# Patient Record
Sex: MALE | Race: WHITE | NOT HISPANIC OR LATINO | Employment: OTHER | URBAN - METROPOLITAN AREA
[De-identification: names, ages, dates, MRNs, and addresses within clinical notes are randomized per-mention and may not be internally consistent; named-entity substitution may affect disease eponyms.]

---

## 2023-03-21 ENCOUNTER — TELEPHONE (OUTPATIENT)
Dept: PRIMARY CARE | Facility: CLINIC | Age: 73
End: 2023-03-21

## 2023-05-03 LAB
CREATININE (MG/DL) IN SER/PLAS: 1.3 MG/DL (ref 0.5–1.3)
GFR MALE: 57 ML/MIN/1.73M2

## 2023-06-20 LAB
ALANINE AMINOTRANSFERASE (SGPT) (U/L) IN SER/PLAS: 19 U/L (ref 10–52)
ALBUMIN (G/DL) IN SER/PLAS: 3.8 G/DL (ref 3.4–5)
ALKALINE PHOSPHATASE (U/L) IN SER/PLAS: 89 U/L (ref 33–136)
ANION GAP IN SER/PLAS: 12 MMOL/L (ref 10–20)
ASPARTATE AMINOTRANSFERASE (SGOT) (U/L) IN SER/PLAS: 14 U/L (ref 9–39)
BASOPHILS (10*3/UL) IN BLOOD BY AUTOMATED COUNT: 0.05 X10E9/L (ref 0–0.1)
BASOPHILS/100 LEUKOCYTES IN BLOOD BY AUTOMATED COUNT: 0.6 % (ref 0–2)
BILIRUBIN TOTAL (MG/DL) IN SER/PLAS: 0.5 MG/DL (ref 0–1.2)
CALCIUM (MG/DL) IN SER/PLAS: 8.9 MG/DL (ref 8.6–10.3)
CARBON DIOXIDE, TOTAL (MMOL/L) IN SER/PLAS: 22 MMOL/L (ref 21–32)
CHLORIDE (MMOL/L) IN SER/PLAS: 109 MMOL/L (ref 98–107)
CREATININE (MG/DL) IN SER/PLAS: 1.21 MG/DL (ref 0.5–1.3)
EOSINOPHILS (10*3/UL) IN BLOOD BY AUTOMATED COUNT: 0.52 X10E9/L (ref 0–0.4)
EOSINOPHILS/100 LEUKOCYTES IN BLOOD BY AUTOMATED COUNT: 6.7 % (ref 0–6)
ERYTHROCYTE DISTRIBUTION WIDTH (RATIO) BY AUTOMATED COUNT: 16.8 % (ref 11.5–14.5)
ERYTHROCYTE MEAN CORPUSCULAR HEMOGLOBIN CONCENTRATION (G/DL) BY AUTOMATED: 32.5 G/DL (ref 32–36)
ERYTHROCYTE MEAN CORPUSCULAR VOLUME (FL) BY AUTOMATED COUNT: 84 FL (ref 80–100)
ERYTHROCYTES (10*6/UL) IN BLOOD BY AUTOMATED COUNT: 6.32 X10E12/L (ref 4.5–5.9)
FERRITIN (UG/LL) IN SER/PLAS: 282 UG/L (ref 20–300)
GENETICS TEST NAME-DATA CONVERSION: NORMAL
GFR MALE: 62 ML/MIN/1.73M2
GLUCOSE (MG/DL) IN SER/PLAS: 92 MG/DL (ref 74–99)
HEMATOCRIT (%) IN BLOOD BY AUTOMATED COUNT: 52.9 % (ref 41–52)
HEMOGLOBIN (G/DL) IN BLOOD: 17.2 G/DL (ref 13.5–17.5)
IMMATURE GRANULOCYTES/100 LEUKOCYTES IN BLOOD BY AUTOMATED COUNT: 0.4 % (ref 0–0.9)
IRON (UG/DL) IN SER/PLAS: 70 UG/DL (ref 35–150)
IRON BINDING CAPACITY (UG/DL) IN SER/PLAS: 288 UG/DL (ref 240–445)
IRON SATURATION (%) IN SER/PLAS: 24 % (ref 25–45)
LAB MOLECULAR CA TECHNICAL NOTES: NORMAL
LEUKOCYTES (10*3/UL) IN BLOOD BY AUTOMATED COUNT: 7.8 X10E9/L (ref 4.4–11.3)
LYMPHOCYTES (10*3/UL) IN BLOOD BY AUTOMATED COUNT: 1.93 X10E9/L (ref 0.8–3)
LYMPHOCYTES/100 LEUKOCYTES IN BLOOD BY AUTOMATED COUNT: 24.7 % (ref 13–44)
MONOCYTES (10*3/UL) IN BLOOD BY AUTOMATED COUNT: 0.7 X10E9/L (ref 0.05–0.8)
MONOCYTES/100 LEUKOCYTES IN BLOOD BY AUTOMATED COUNT: 9 % (ref 2–10)
NEUTROPHILS (10*3/UL) IN BLOOD BY AUTOMATED COUNT: 4.58 X10E9/L (ref 1.6–5.5)
NEUTROPHILS/100 LEUKOCYTES IN BLOOD BY AUTOMATED COUNT: 58.6 % (ref 40–80)
PLATELETS (10*3/UL) IN BLOOD AUTOMATED COUNT: 186 X10E9/L (ref 150–450)
POTASSIUM (MMOL/L) IN SER/PLAS: 4.3 MMOL/L (ref 3.5–5.3)
PROTEIN TOTAL: 6.6 G/DL (ref 6.4–8.2)
SODIUM (MMOL/L) IN SER/PLAS: 139 MMOL/L (ref 136–145)
UREA NITROGEN (MG/DL) IN SER/PLAS: 26 MG/DL (ref 6–23)

## 2023-07-28 RX ORDER — PROPYLENE GLYCOL 0.6 G/100ML
LIQUID OPHTHALMIC DAILY
COMMUNITY

## 2023-07-28 RX ORDER — FLUTICASONE PROPIONATE 50 MCG
2 SPRAY, SUSPENSION (ML) NASAL DAILY
COMMUNITY
Start: 2019-08-08

## 2023-07-28 RX ORDER — CHOLESTYRAMINE 4 G/9G
1 POWDER, FOR SUSPENSION ORAL 2 TIMES DAILY
COMMUNITY
Start: 2019-01-10 | End: 2023-07-31 | Stop reason: SDUPTHER

## 2023-07-28 RX ORDER — TAMSULOSIN HYDROCHLORIDE 0.4 MG/1
CAPSULE ORAL
COMMUNITY
Start: 2017-10-20 | End: 2024-03-19 | Stop reason: SDUPTHER

## 2023-07-28 RX ORDER — LISINOPRIL 20 MG/1
1 TABLET ORAL DAILY
COMMUNITY
Start: 2019-08-06 | End: 2023-07-31 | Stop reason: SDUPTHER

## 2023-07-28 RX ORDER — CHOLECALCIFEROL (VITAMIN D3) 125 MCG
CAPSULE ORAL DAILY
COMMUNITY

## 2023-07-28 RX ORDER — MESALAMINE 400 MG/1
800 CAPSULE, DELAYED RELEASE ORAL 2 TIMES DAILY
COMMUNITY
Start: 2019-06-27 | End: 2023-07-31 | Stop reason: SDUPTHER

## 2023-07-28 RX ORDER — CETIRIZINE HYDROCHLORIDE 10 MG/1
TABLET ORAL DAILY
COMMUNITY

## 2023-07-31 ENCOUNTER — OFFICE VISIT (OUTPATIENT)
Dept: PRIMARY CARE | Facility: CLINIC | Age: 73
End: 2023-07-31
Payer: MEDICARE

## 2023-07-31 VITALS
SYSTOLIC BLOOD PRESSURE: 164 MMHG | WEIGHT: 203 LBS | BODY MASS INDEX: 32.62 KG/M2 | TEMPERATURE: 97.2 F | OXYGEN SATURATION: 96 % | DIASTOLIC BLOOD PRESSURE: 88 MMHG | HEIGHT: 66 IN | HEART RATE: 79 BPM

## 2023-07-31 DIAGNOSIS — K51.90 ULCERATIVE COLITIS WITHOUT COMPLICATIONS, UNSPECIFIED LOCATION (MULTI): ICD-10-CM

## 2023-07-31 DIAGNOSIS — J30.1 ALLERGIC RHINITIS DUE TO POLLEN, UNSPECIFIED SEASONALITY: ICD-10-CM

## 2023-07-31 DIAGNOSIS — K21.9 CHRONIC GERD: ICD-10-CM

## 2023-07-31 DIAGNOSIS — M16.11 PRIMARY OSTEOARTHRITIS OF RIGHT HIP: ICD-10-CM

## 2023-07-31 DIAGNOSIS — D75.1 POLYCYTHEMIA: ICD-10-CM

## 2023-07-31 DIAGNOSIS — I10 ESSENTIAL HYPERTENSION: Primary | ICD-10-CM

## 2023-07-31 DIAGNOSIS — Z13.6 SCREENING FOR CARDIOVASCULAR CONDITION: ICD-10-CM

## 2023-07-31 DIAGNOSIS — N40.1 BENIGN PROSTATIC HYPERPLASIA WITH LOWER URINARY TRACT SYMPTOMS, SYMPTOM DETAILS UNSPECIFIED: ICD-10-CM

## 2023-07-31 PROBLEM — R73.01 IFG (IMPAIRED FASTING GLUCOSE): Status: ACTIVE | Noted: 2023-07-31

## 2023-07-31 PROBLEM — N40.0 BENIGN ENLARGEMENT OF PROSTATE: Status: ACTIVE | Noted: 2023-07-31

## 2023-07-31 PROBLEM — J30.9 ALLERGIC RHINITIS: Status: ACTIVE | Noted: 2023-07-31

## 2023-07-31 PROBLEM — Z85.51 HISTORY OF BLADDER CANCER: Status: ACTIVE | Noted: 2023-07-31

## 2023-07-31 PROCEDURE — 99214 OFFICE O/P EST MOD 30 MIN: CPT | Performed by: INTERNAL MEDICINE

## 2023-07-31 PROCEDURE — 1036F TOBACCO NON-USER: CPT | Performed by: INTERNAL MEDICINE

## 2023-07-31 PROCEDURE — 3077F SYST BP >= 140 MM HG: CPT | Performed by: INTERNAL MEDICINE

## 2023-07-31 PROCEDURE — 1159F MED LIST DOCD IN RCRD: CPT | Performed by: INTERNAL MEDICINE

## 2023-07-31 PROCEDURE — 3079F DIAST BP 80-89 MM HG: CPT | Performed by: INTERNAL MEDICINE

## 2023-07-31 RX ORDER — LISINOPRIL 20 MG/1
20 TABLET ORAL DAILY
Qty: 90 TABLET | Refills: 1 | Status: SHIPPED | OUTPATIENT
Start: 2023-07-31 | End: 2024-01-31 | Stop reason: SDUPTHER

## 2023-07-31 RX ORDER — CHOLESTYRAMINE 4 G/9G
1 POWDER, FOR SUSPENSION ORAL 2 TIMES DAILY
Qty: 180 PACKET | Refills: 1 | Status: SHIPPED | OUTPATIENT
Start: 2023-07-31 | End: 2024-01-31 | Stop reason: SDUPTHER

## 2023-07-31 RX ORDER — MESALAMINE 400 MG/1
800 CAPSULE, DELAYED RELEASE ORAL 2 TIMES DAILY
Qty: 360 CAPSULE | Refills: 1 | Status: SHIPPED | OUTPATIENT
Start: 2023-07-31 | End: 2024-01-31 | Stop reason: SDUPTHER

## 2023-07-31 ASSESSMENT — ENCOUNTER SYMPTOMS
ENDOCRINE NEGATIVE: 1
GASTROINTESTINAL NEGATIVE: 1
MUSCULOSKELETAL NEGATIVE: 1
PSYCHIATRIC NEGATIVE: 1
ALLERGIC/IMMUNOLOGIC NEGATIVE: 1
HEMATOLOGIC/LYMPHATIC NEGATIVE: 1
RESPIRATORY NEGATIVE: 1
NEUROLOGICAL NEGATIVE: 1
CONSTITUTIONAL NEGATIVE: 1
EYES NEGATIVE: 1
CARDIOVASCULAR NEGATIVE: 1

## 2023-07-31 NOTE — PROGRESS NOTES
"Subjective   Patient ID: Jaime Mcnair is a 73 y.o. male who presents for 6 month follow up .  Patient presents today in follow up re:   HTN, GERD and allergic rhinitis.    Patient presents in follow up regarding hypertension.  Blood pressure has been well controlled on current therapy.  No adverse effects of medication reported.  Patient denies chest pain, palpitations, shortness of breath, orthopnea, fatigue or edema.    Patient reports reflux symptoms have been well controlled with current therapy.  Does have occasional flare of Sx after eating certain food.  No dysphagia, regurgitation or other associated complaints.    Rhinitis Sx well compensated on current therapy.          Review of Systems   Constitutional: Negative.    HENT: Negative.     Eyes: Negative.    Respiratory: Negative.     Cardiovascular: Negative.    Gastrointestinal: Negative.    Endocrine: Negative.    Genitourinary: Negative.    Musculoskeletal: Negative.    Skin: Negative.    Allergic/Immunologic: Negative.    Neurological: Negative.    Hematological: Negative.    Psychiatric/Behavioral: Negative.         Objective     Blood pressure 164/88, pulse 79, temperature 36.2 °C (97.2 °F), temperature source Temporal, height 1.67 m (5' 5.75\"), weight 92.1 kg (203 lb), SpO2 96 %.   Physical Exam  Vitals reviewed.   Constitutional:       Appearance: Normal appearance.   Neck:      Vascular: No carotid bruit.   Cardiovascular:      Rate and Rhythm: Normal rate and regular rhythm.      Pulses: Normal pulses.      Heart sounds: Normal heart sounds. No murmur heard.  Pulmonary:      Effort: Pulmonary effort is normal.      Breath sounds: Normal breath sounds. No wheezing or rales.   Abdominal:      General: Abdomen is flat. There is no distension.      Palpations: Abdomen is soft.      Tenderness: There is no abdominal tenderness.   Musculoskeletal:         General: Normal range of motion.      Cervical back: Normal range of motion and neck supple. "   Skin:     General: Skin is warm and dry.   Neurological:      General: No focal deficit present.      Mental Status: He is alert and oriented to person, place, and time.   Psychiatric:         Mood and Affect: Mood normal.         Behavior: Behavior normal.         Assessment/Plan   Problem List Items Addressed This Visit       Allergic rhinitis    Benign enlargement of prostate    Relevant Orders    Prostate Specific Antigen    Chronic GERD    Essential hypertension - Primary    Relevant Medications    lisinopril 20 mg tablet    Other Relevant Orders    Comprehensive Metabolic Panel    Polycythemia    Relevant Orders    CBC and Auto Differential    Primary osteoarthritis of right hip    Ulcerative colitis (CMS/HCC)    Relevant Medications    cholestyramine (Questran) 4 gram packet    mesalamine (Delzicol) 400 mg DR capsule     Other Visit Diagnoses       Screening for cardiovascular condition        Relevant Orders    Lipid Panel          BP well controlled on current therapy.  Will continue present therapy and follow.  GERD Sx well compensated on current therapy.  Will continue present therapy and follow clinically.  Rhinitis Sx well compensated on current therapy.  Will continue present therapy and follow.  Patient is scheduled to have right hip arthroplasty in the near future per Dr. Little.  He continues to follow with Heme re:  polycythemia.    Follow up in 6 months  Lab to be drawn 1 week prior to next office visit.

## 2023-08-16 ENCOUNTER — HOSPITAL ENCOUNTER (OUTPATIENT)
Dept: DATA CONVERSION | Facility: HOSPITAL | Age: 73
End: 2023-08-17
Attending: ORTHOPAEDIC SURGERY | Admitting: ORTHOPAEDIC SURGERY
Payer: MEDICARE

## 2023-08-16 DIAGNOSIS — M16.11 UNILATERAL PRIMARY OSTEOARTHRITIS, RIGHT HIP: ICD-10-CM

## 2023-08-16 DIAGNOSIS — N40.0 BENIGN PROSTATIC HYPERPLASIA WITHOUT LOWER URINARY TRACT SYMPTOMS: ICD-10-CM

## 2023-08-16 DIAGNOSIS — K21.9 GASTRO-ESOPHAGEAL REFLUX DISEASE WITHOUT ESOPHAGITIS: ICD-10-CM

## 2023-08-16 DIAGNOSIS — I10 ESSENTIAL (PRIMARY) HYPERTENSION: ICD-10-CM

## 2023-08-16 DIAGNOSIS — E78.5 HYPERLIPIDEMIA, UNSPECIFIED: ICD-10-CM

## 2023-08-16 DIAGNOSIS — Z87.891 PERSONAL HISTORY OF NICOTINE DEPENDENCE: ICD-10-CM

## 2023-08-16 DIAGNOSIS — E66.9 OBESITY, UNSPECIFIED: ICD-10-CM

## 2023-08-16 LAB
ABO GROUP (TYPE) IN BLOOD: NORMAL
ANTIBODY SCREEN: NORMAL
RH FACTOR: NORMAL

## 2023-08-17 LAB
ALANINE AMINOTRANSFERASE (SGPT) (U/L) IN SER/PLAS: 16 U/L (ref 10–52)
ALBUMIN (G/DL) IN SER/PLAS: 3.2 G/DL (ref 3.4–5)
ALKALINE PHOSPHATASE (U/L) IN SER/PLAS: 73 U/L (ref 33–136)
ANION GAP IN SER/PLAS: 11 MMOL/L (ref 10–20)
ASPARTATE AMINOTRANSFERASE (SGOT) (U/L) IN SER/PLAS: 26 U/L (ref 9–39)
BASOPHILS (10*3/UL) IN BLOOD BY AUTOMATED COUNT: 0.03 X10E9/L (ref 0–0.1)
BASOPHILS/100 LEUKOCYTES IN BLOOD BY AUTOMATED COUNT: 0.2 % (ref 0–2)
BILIRUBIN TOTAL (MG/DL) IN SER/PLAS: 0.4 MG/DL (ref 0–1.2)
CALCIUM (MG/DL) IN SER/PLAS: 8.1 MG/DL (ref 8.6–10.3)
CARBON DIOXIDE, TOTAL (MMOL/L) IN SER/PLAS: 23 MMOL/L (ref 21–32)
CHLORIDE (MMOL/L) IN SER/PLAS: 106 MMOL/L (ref 98–107)
CREATININE (MG/DL) IN SER/PLAS: 1.39 MG/DL (ref 0.5–1.3)
EOSINOPHILS (10*3/UL) IN BLOOD BY AUTOMATED COUNT: 0.01 X10E9/L (ref 0–0.4)
EOSINOPHILS/100 LEUKOCYTES IN BLOOD BY AUTOMATED COUNT: 0.1 % (ref 0–6)
ERYTHROCYTE DISTRIBUTION WIDTH (RATIO) BY AUTOMATED COUNT: 14.8 % (ref 11.5–14.5)
ERYTHROCYTE MEAN CORPUSCULAR HEMOGLOBIN CONCENTRATION (G/DL) BY AUTOMATED: 33.9 G/DL (ref 32–36)
ERYTHROCYTE MEAN CORPUSCULAR VOLUME (FL) BY AUTOMATED COUNT: 81 FL (ref 80–100)
ERYTHROCYTES (10*6/UL) IN BLOOD BY AUTOMATED COUNT: 5.46 X10E12/L (ref 4.5–5.9)
GFR MALE: 53 ML/MIN/1.73M2
GLUCOSE (MG/DL) IN SER/PLAS: 110 MG/DL (ref 74–99)
HEMATOCRIT (%) IN BLOOD BY AUTOMATED COUNT: 44.2 % (ref 41–52)
HEMOGLOBIN (G/DL) IN BLOOD: 15 G/DL (ref 13.5–17.5)
IMMATURE GRANULOCYTES/100 LEUKOCYTES IN BLOOD BY AUTOMATED COUNT: 0.4 % (ref 0–0.9)
LEUKOCYTES (10*3/UL) IN BLOOD BY AUTOMATED COUNT: 13.4 X10E9/L (ref 4.4–11.3)
LYMPHOCYTES (10*3/UL) IN BLOOD BY AUTOMATED COUNT: 1.19 X10E9/L (ref 0.8–3)
LYMPHOCYTES/100 LEUKOCYTES IN BLOOD BY AUTOMATED COUNT: 8.9 % (ref 13–44)
MONOCYTES (10*3/UL) IN BLOOD BY AUTOMATED COUNT: 1.47 X10E9/L (ref 0.05–0.8)
MONOCYTES/100 LEUKOCYTES IN BLOOD BY AUTOMATED COUNT: 11 % (ref 2–10)
NEUTROPHILS (10*3/UL) IN BLOOD BY AUTOMATED COUNT: 10.6 X10E9/L (ref 1.6–5.5)
NEUTROPHILS/100 LEUKOCYTES IN BLOOD BY AUTOMATED COUNT: 79.4 % (ref 40–80)
PLATELETS (10*3/UL) IN BLOOD AUTOMATED COUNT: 197 X10E9/L (ref 150–450)
POTASSIUM (MMOL/L) IN SER/PLAS: 4.5 MMOL/L (ref 3.5–5.3)
PROTEIN TOTAL: 5.5 G/DL (ref 6.4–8.2)
SODIUM (MMOL/L) IN SER/PLAS: 135 MMOL/L (ref 136–145)
UREA NITROGEN (MG/DL) IN SER/PLAS: 23 MG/DL (ref 6–23)

## 2023-09-29 VITALS — HEIGHT: 68 IN | WEIGHT: 202.82 LBS | BODY MASS INDEX: 30.74 KG/M2

## 2023-09-29 PROBLEM — R35.1 NOCTURIA: Status: ACTIVE | Noted: 2023-09-29

## 2023-09-29 PROBLEM — R10.31 ABDOMINAL PAIN, ACUTE, RIGHT LOWER QUADRANT: Status: ACTIVE | Noted: 2023-09-29

## 2023-09-29 RX ORDER — TRAMADOL HYDROCHLORIDE 50 MG/1
TABLET ORAL
COMMUNITY
Start: 2023-08-15 | End: 2024-01-31 | Stop reason: ALTCHOICE

## 2023-09-29 RX ORDER — CYCLOBENZAPRINE HCL 5 MG
TABLET ORAL
COMMUNITY
Start: 2023-08-15 | End: 2024-03-12 | Stop reason: WASHOUT

## 2023-09-29 RX ORDER — LISINOPRIL 10 MG/1
TABLET ORAL
COMMUNITY
Start: 2017-03-13 | End: 2024-01-31 | Stop reason: WASHOUT

## 2023-09-29 RX ORDER — OXYCODONE HYDROCHLORIDE 5 MG/1
TABLET ORAL
COMMUNITY
Start: 2023-08-15 | End: 2024-01-31 | Stop reason: ALTCHOICE

## 2023-09-29 RX ORDER — MELOXICAM 15 MG/1
TABLET ORAL
COMMUNITY
Start: 2017-05-03 | End: 2024-03-12 | Stop reason: WASHOUT

## 2023-09-29 RX ORDER — METHOCARBAMOL 750 MG/1
750 TABLET, FILM COATED ORAL 3 TIMES DAILY
COMMUNITY
Start: 2023-04-07 | End: 2024-01-31 | Stop reason: ALTCHOICE

## 2023-09-29 RX ORDER — PANTOPRAZOLE SODIUM 40 MG/1
TABLET, DELAYED RELEASE ORAL
COMMUNITY
Start: 2023-08-15

## 2023-09-30 NOTE — DISCHARGE SUMMARY
Send Summary:   Discharge Summary Providers:  Provider Role Provider Name   · Primary Josafat Barillas       Note Recipients: Josafat Barillas MD       Discharge:    Summary:   Admission Date: .16-Aug-2023 07:21:00   Discharge Date: 17-Aug-2023   Attending Physician at Discharge: Juan Little   Admission Reason: Right hip DJD   Final Discharge Diagnoses: Status post right hip  replacement   Procedures: Date: 16-Aug-2023 10:53:00  Procedure Name: 1. RIGHT TOTAL HIP ARTHROPLASTY   Condition at Discharge: Satisfactory   Disposition at Discharge: .Home   Hospital Course:    ORTHOPEDIC SURGERY DISCHARGE SUMMARY  Attending Physician: Dr Juan Little   Reason for Hospitalization: Elective total hip arthroplasty     Admitting Diagnosis: Right hip degenerative joint disease  Discharge Diagnosis: Same as admitting     Operations During Hospitalization: Right Total hip arthroplasty     Consultations: Physical Therapy, Case Management      Hospital Course:  This patient presented to admitting provider as an outpatient for hip osteoarthritis. It was determined that they would benefit from surgery in the form of a hip replacement. The procedure, its risks, benefits, and potential complications were discussed  in detail with the patient prior to surgery. Understanding of all topics was conveyed by the patient, and consent was given for surgery. The patient was electively admitted for surgery. Surgery was scheduled and they underwent a hip replacement.  The  procedure was tolerated well and they were sent to the post operative recovery room in stable condition, where they also did well. They were subsequently sent to their hospital room for postoperative management. Once on the floor their postoperative course  was unremarkable and they did well.  Thier diet was advanced and well tolerated. Their pain was well controlled on oral pain meds; this was eventually transitioned to oral medication alone prior to discharge.  They worked with  Physical and Occupational  Therapy starting on POD# 0 who recommended they be discharged home. They remained afebrile with stable vital signs throughout her stay. Complete and comprehensive discharge instructions were provided to the patient as well as necessary prescriptions.  The patient had no further questions and was advised to call with any questions, concerns, or problems.     Patient was hemodynamically stable postoperatively. Discharge Antibiotics: Prior to surgery the patient was treated with antibiotics and continued with antibiotics 24 hours postoperatively    Transfers: PACU and then to the hospital surgical floor when PACU criteria was met.  Complications: Continued throughout the hospital course without complications.     PHYSICAL EXAM   GENERAL: A/Ox3, NAD. Appears healthy, well nourished  PSYCHIATRIC: Mood stable, appropriate memory recall  EYES: EOM intact, no scleral icterus  CARDIAC: regular rate  LUNGS: Breathing non-labored  SKIN: no erythema, rashes, or ecchymoses     MUSCULOSKELETAL:  Laterality:   Operative Hip Exam  - ROM, Extension: Full  - Dressing: clean and dry  - Negative homans  - Compartments soft  - Grossly NVI Distally on operative leg    NEUROVASCULAR:  - Neurovascular Status: sensation intact to light touch distally  - Capillary refill brisk at extremities, Bilateral dorsalis pedis pulse 2+        Discharge Information:    and Continuing Care:   Lab Results - Pending:    None  Radiology Results - Pending: None   Discharge Instructions:    Additional Orders:           Additional Instructions:   Juan Little DO  Phone: [phone] - Shanna Medical Assistant    Postoperative Instructions: TOTAL HIP ARTHROPLASTY    The following is a general guide and may be applied to most patients that go home from the hospital after surgery. If you go to a rehab facility the timeline may deviate a little. Please do not hesitate to call our office for any questions or additional  guidance. We  will work with you to get the best experience from your new joint replacement.     WEEK 1  Relax?Don?t Overdo it!  - Get up once an hour for light activity while you are awake. (get a drink, go to the bathroom, etc.)  - Keep the surgical site iced and elevated above your heart, if possible, while you are resting.  - You will have some light exercises given to you from the physical therapist in the hospital. They will teach you posterior hip precautions that you should be mindful of for the first six weeks after surgery.    SWELLING AND BRUISING    BRUISING AND SWELLING AFTER SURGERY IS NORMAL. As the patient becomes more mobile the bruising and swelling will begin to migrate towards the ankle and foot and is usually noticed toward the end of the day.    WEEK 2-3  You will have a prescription for physical therapy given to you or electronically prescribed and you should start outpatient therapy one week after your operation. Be sure to do the home exercises on the days you are not attending physical therapy.    - Continue to progress walking as tolerated.   - Continue to use ice for soreness on the surgical site  - You may wean from your walker to a cane when you feel safe and comfortable to do so. Your physical therapist will also be helpful with progressing your assistive device.   - Be careful with bending and twisting - If it hurts, stop!!    FOLLOW UP  - Your first post-operative visit with Dr. Juan Little should be scheduled for 2 WEEKS after surgery.  - You do not need new xrays for this visit  - Don?t be nervous! This visit is to see how you are doing and make sure your incision is healing nicely.     POSTOPERATIVE MEDICATIONS    PAIN MEDICATION    _______ Oxycodone  ? Oxycodone has been prescribed for post-operative pain control. Take one 5 mg tablet every 6 hours for pain. Alternate with Tramadol. See medication example sheet. This medication will only be refilled ONCE every 7 days for a period of 6 weeks.   After 6 weeks, you will transition to acetaminophen (Tylenol) and over -the- counter anti-inflammatories such as Ibuprofen, Advil or Aleve in conjunction with ICE.   ? Side effects may be constipation and nausea, vomiting, sleepiness, dizziness, lightheadedness, headache, blurred vision, dry mouth sweating, itching (if you have itching, over-the -counter Benadryl can be used as needed).  ? You may NOT operate a motor vehicle while taking this medication or have been cleared by your surgeon or PA.     ________ Tramadol  ? Tramadol has been prescribed for post-operative pain control. Take one 50 mg tablet every 6 hours for pain. Alternate with Oxycodone. See medication example sheet. This medication will only be refilled ONCE every 7 days for a period of 6  weeks. After 6 weeks, you will transition to acetaminophen (Tylenol) and over- the- counter anti-inflammatories such as Ibuprofen, Advil or Aleve in conjunction with ICE.   ? Side effects may be constipation and nausea, vomiting, sleepiness, dizziness, lightheadedness, headache, blurred vision, dry mouth, sweating, itching (if you have itching, over-the -counter Benadryl can be used as needed).  ? You may NOT operate a motor vehicle while taking this medication or have been cleared by your surgeon or PA.     _________ Celecoxib (Celebrex) or Meloxicam (Mobic)  ? One of these will be prescribed (if you are able to take it) as an adjunct anti-inflammatory to assist in pain control. Take one twice daily for 4 weeks. See medication example sheet. You will not receive refills on this medication.  ? Side effects may include nausea or upset stomach    _________ Acetaminophen (Tylenol)  ? Acetaminophen has been prescribed as an adjunct for pain control. Take two 500 mg tablet every 6 - 8 hours for 4 weeks. See medication example sheet. No refills will be given after initial prescription.  ? Side effects may include nausea, heartburn, drowsiness, and  headache.    _________Cyclobenzaprine (Flexeril)  ? This is a muscle relaxer that will be prescribed   ? Take this AS NEEDED per instructions on bottle  ? This will be only prescribed once and is available to help with muscle spasms. There will be no refills of this medication    BLOOD THINNER    __________ Aspirin or Other Blood Thinner  ? Aspirin or another medication has been prescribed as a blood thinner to prevent blood clots in your leg or lungs. Take as prescribed on the bottle for 4 weeks. You will not receive a refill on this medication.  ? Do not take this medication if you are on another blood thinner.    ANTI NAUSEA    __________ Pantoprazole  ? Pantoprazole has been prescribed to help with nausea and protect your stomach while taking pain medication. Take one 40 mg tablet once daily for 4 weeks. You will not receive a refill on this medication.    ANTIBIOTIC    ? If you are deemed ?high risk? for infection after surgery and antibiotic will be prescribed. Please take this as directed for one week.     STOOL SOFTENERS    __________ Senna  ? Post-operative constipation can result due to a combination of inactivity, anesthesia and pain medication. To help prevent this, you should increase your water and fiber intake. Physical activity such as walking will also help stimulate the  bowels.   ? Senna has been prescribed to help with constipation while on Oxycodone and Tramadol. Take one tablet twice daily for 4 weeks. No refills will be given.  ? You may also take Miralax in combination with Senna to prevent constipation.  This can be purchased over the counter at your local pharmacy.  Take as instructed on the bottle.     Medication Refills - [phone]    If you need to request a medication refill, calls can be taken between Monday through Friday, 8am-1pm.  Any calls received outside of this timeframe will be handled on the next business day.  Medication requests received on Saturday or Sunday will be   handled on Monday.    Per State and Institutional policy, pain medications can only be refilled every 7 days for six weeks following surgery.    Prescription refills will be placed upon request, if there are any issues we will contact you accordingly.  We are unable to place follow up calls to confirm receipt of refill requests.  Please follow up with your pharmacy to verify that your refill has  been sent and is ready for .    ICE  ? You have been prescribed to ice your total joint at a minimum of twice per hour for 20 minutes while awake during the first 6 weeks after surgery if you are using ice packs. This will help with pain control.  ? If you are using an ice machine, please follow ice machine instructions.    WOUND CARE    ? You will have an ace wrap on your leg put on during surgery. This compression wrap is for comfort and may be removed at any time. You may continue to use compression throughout your recovery if this is comfortable for you.  ? You have a waterproof bandage on your wound and may shower with this on. The waterproof bandage is to remain in place for 7 days. You may remove it yourself. You may leave your incision open to air after the bandage has been removed.    ? Under your waterproof bandage you have a mesh and glue dressin in place. Do not peel this off. This will be on for 3-4 weeks. You may trim the edges as they peel off on their own. You can continue to shower with this on. Let the water run  freely over your incision when showering and do not scrub.     ? DO NOT soak your incision in a bath, hot tub, pool or pond/lake for a minimum of 3 weeks following your surgery.    ? DO NOT use lotions, creams, ointments on your wound for a minimum of 3 weeks following your surgery. At that time you may use vitamin E to assist with softening of your incision.    DENTAL VISITS  It is recommended that you avoid any elective dental work (cleaning, whitening, etc) for 3 months after your  surgery. In case of a dental emergency (cavity, root canal) within the 3 month postoperative period you should be pre-medicated with antibiotics.  Please call us before any dental work so we can provide you with antibiotic coverage.     After 3 months, most healthy individuals do not require antibiotics for dental visits.    ?High Risk? patients (chemotherapy, history of transplant, immunocompromised, rheumatoid arthritis) will need antibiotics prior to dental work.  If you think you may be in this category please call the office for assistance.    EMERGENCIES  When to contact our office immediately:  ? Fever >101.5 for at least 48 hours after surgery or chills.  ? Excessive bleeding from incision(s). A small amount of drainage is normal and expected.  ? Signs of infection of incision(s)-excessive drainage that is soaking through your dressing (especially if it is pus-like), redness that is spreading out from the edges of your incision, or increased warmth around the area.  ? Excruciating pain for which the pain medication is not helping.  ? Severe calf pain.  ? Go directly to the emergency room or call 911, if you are experiencing chest pain or difficulty breathing.    RESTARTING HOME MEDICATIONS  ? You may restart your home medications the following day after your surgery UNLESS you have been given alternate instructions.    DIET  ? Resume your normal diet after surgery. If you are on a specific type of diet for your condition, resume that instead.        Total Joint Replacement Cold Therapy Recommendations      Cold therapy devices can be used before and after surgery to assist in comfort and help to reduce pain and swelling.  These devices differ from ice or ice packs whereas the mechanism circulates water through tubing and a pad to provide longer periods  of cold therapy to the desired site.  While in the hospital, you can use your cold devices around the clock for optimal comfort.  We recommend using cold  therapy after working with therapy or completing exercises on your own.  Once you are discharged  home, there is no set schedule in which you must follow while using cold therapy.  Below are a few points to remember when using a cold therapy device:    ? Read the ?s instructions prior to first the use.  ? Follow instructions for filling the cooler (water first, then ice).  ? Always make sure there is a layer of protection between the cold pad and your skin  o Clothing, Towel, Ace Bandage, etc.  ? Allow the device to circulate cold water throughout the pad prior wrapping the pad (approximately 10 minutes).  ? Place the pad appropriately to accommodate your needs and use the wraps provided to secure the pad to your body.  ? During waking hours, remove the cold pad every 1-2 hours to perform a skin check  o Detach the pad from the cooler and ambulate at least once every hour  ? After removing the pad, allow at least 30 minutes before resuming cold therapy  ? You may wear the cold therapy device during periods of sleep including overnight  o   If you wake up during the night, you can check the skin at this time.  You do not need to   wake up specifically to perform skin checks.  ? Empty the cooler and pad when device is not in use.  ? Follow ?s instructions for cleaning your cold therapy device.    OFFICE LOCATIONS    Location 1: [facility]  [address], [address]  Office Number: [phone]    Location 2: [facility]  [address]  [place], [address]  Office Number: [phone]    Location 3: [place] [id number]  Office Number: [phone]    Juan Little DO  Joint Replacement and Adult Reconstructive Surgery  [facility]      Home Care Certification:           Home Care Agency:    Home Team [phone]          Skilled Disciplines Ordered:   PT,  OT    Home Care Services:            Home Care Skilled Service:   Rehab (PT/OT/SP eval and treat)    Discharge Medications: Home Medication   Fish Oil 1000 mg oral capsule - 1 cap(s) oral once a day  cholecalciferol 50 mcg oral capsule - 1 cap(s) oral once a day  cholestyramine sugar-free 4 g/5 g oral powder for reconstitution - 1 dose oral 2 times a day  colon health - 1 tab(s) oral once a day  lisinopril 20 mg oral tablet - 1 tab(s) oral once a day  ZyrTEC 10 mg oral tablet - 1 tab(s) oral once a day  acetaminophen 500 mg oral tablet - 2 tab(s) orally every 6 to 8 hours  for pain   aspirin 81 mg oral tablet, chewable - 1 tab(s) chewed 2 times a day for four weeks to prevent the formation of blood clots  CeleBREX 100 mg oral capsule - 1 cap(s) orally 2 times a day for pain, inflammation, swelling  cyclobenzaprine 5 mg oral tablet - 1 tab(s) orally 3 times a day as needed for muscle spasms  oxyCODONE 5 mg oral capsule - 1 cap(s) orally every 6 hours as needed for severe pain  Protonix 40 mg oral delayed release tablet - 1 tab(s) orally once a day   senna 8.6 mg oral tablet - 1 tab(s) orally 2 times a day as needed to prevent constipation   traMADol 50 mg oral tablet - 1 tab(s) orally every 6 hours for severe pain  Fluticasone Propionate 50 mcg/inh nasal spray - 2 spray(s) nasal once a day  tamsulosin 0.4 mg oral capsule - 2 cap(s) oral once a day at bedtime  mesalamine 800 mg oral delayed release tablet - 1 cap(s) oral twice a day     PRN Medication     DNR Status:   ·  Code Status Code Status order at time of discharge: Full Code       Electronic Signatures:  Juan Little)  (Signed 17-Aug-2023 09:13)   Authored: Send Summary, Summary Content, Ongoing Care,  DNR Status, Note Completion      Last Updated: 17-Aug-2023 09:13 by Juan Little ()

## 2023-09-30 NOTE — H&P
History & Physical Reviewed:   I have reviewed the History and Physical dated:  01-Aug-2023   History and Physical reviewed and relevant findings noted. Patient examined to review pertinent physical  findings.: No significant changes   Home Medications Reviewed: no changes noted   Allergies Reviewed: no changes noted       ERAS (Enhanced Recovery After Surgery):  ·  ERAS Patient: yes   ·  CPM/PAT Utilization: yes   ·  Immunonutrition Recovery Drink Utilization: no   ·  Carbohydrate Supplement Drink Utilization: no     Consent:   COVID-19 Consent:  ·  COVID-19 Risk Consent Surgeon has reviewed key risks related to the risk of andi COVID-19 and if they contract COVID-19 what the risks are.       Electronic Signatures:  Juan Little ()  (Signed 16-Aug-2023 09:17)   Authored: History & Physical Reviewed, ERAS, Consent,  Note Completion      Last Updated: 16-Aug-2023 09:17 by Juan Little ()

## 2023-10-01 NOTE — OP NOTE
PROCEDURE DETAILS    Preoperative Diagnosis:  Osteoarthritis of right hip, M16.11    Postoperative Diagnosis:  Osteoarthritis of right hip, M16.11    Surgeon: Juna Little  Resident/Fellow/Other Assistant: None of these were associated with this case    Procedure:  1. RIGHT TOTAL HIP ARTHROPLASTY    Anesthesia: No anesthesiologist associated with this case  Estimated Blood Loss: 100cc  Findings: see op note  Additional Details: OPERATIVE NOTE   MRN: [id number]  Surgery Date: 8/16/2023    Anesthesia: spinal, local    Implant(s):   Julieta Trident Hemispherical Shell: 54  with neutral X3 polyethylene liner  [place] Accolade II femoral stem: 6 high offset  Biolox Ceramic Femoral head: 36 - 2.5 mm  [place] 6.5 mm cancellous screw      OPERATIVE INDICATIONS:  The patient  is well-known to me and initially presented as an outpatient. They have been treated for advanced hip osteoarthritis with therapy, anti-inflammatories, and activity modification, all without improvement of symptoms. We therefore reviewed  the alternative option of elective total hip arthroplasty. The risks and benefits of this procedure were discussed in detail as an outpatient and are documented in our outpatient record. The patient expressed full understanding and wished to proceed.  Informed consent was obtained and was placed on the medical chart    The risks, benefits and potential complications of the arthroplasty surgery were discussed with the patient in detail.  Risks including but not limited to the risk of anesthesia, DVT, pulmonary embolism with the possibility of death, retained infection,  neurovascular injury, and leg length discrepancy.  Specific details of the procedure, hospitalization, recovery, rehabilitation, and long-term precautions were also provided. Pre-operative teaching was provided. Implant/prosthesis selection was outlined,  and the many options available were explained; the final choice will be made at the time of the  procedure to match the anatomy and condition of the bone, ligaments, tendons, and muscles. Understanding of all topics was conveyed to me by the patient, and  consent was given to proceed with a total hip arthroplasty.       Procedure:  The patient was identified in the preoperative area .Their hip was then marked by myself and the patient agreed to proceed with the outlined procedure.. They were transferred to the operating room and positioned supine on the OR table. Appropriate surgical  huddle was performed with myself present. Anesthesia was then successfully induced. The patient was then positioned in the lateral decubitus position on a pegboard. All bony prominences were well-padded. The operative lower extremity was then prepped  and draped in the normal sterile fashion. A critical pause was taken and we identified the patient, the site of surgery, as well as the administration of preoperative IV antibiotics. The limb was then positioned neutral on a padded Encarnacion stand and bony  landmarks were identified on the skin.    A posterior- superior hip incision was then performed with the #10 blade scalpel and a minimally  invasive direct superior approach was made. The subcutaneous tissues were developed down to the level of the fascia. Electrocautery was used to achieve hemostasis. The fibers of the gluteus sai were split bluntly just short of the level of the fascia  radhika.The exposed pericapsular fat was removed with electrocautery. The interval between the gluteus medius and the piriformis tendon was then developed. The conjoint tendon was isolated and released from its insertion on the trochanter, tagged and retracted  posteriorly, protecting the sciatic nerve through the remainder of the case. The gluteus minimus was elevated from the capsule and a capsulotomy was then performed. Intracapsular retractors were positioned around the femoral neck and the hip was dislocated  posteriorly. The dislocated femoral  head was noted to be eburnated over the majority of its surface with circumferential head and neck junction osteophytes. Using a saw, a  provisional femoral neck osteotomy was then performed at a level based on the preoperative template. The femoral head was excised. The limb was then positioned neutral on a Encarnacion stand and I proceeded with acetabular exposure. A Jacqueline-type retractor was  placed over the anterior column and an inferior capsular retractor was positioned below the acetabular teardrop. The acetabulum was prepared with hemispherical reamers. Starting with a size 45 mm reamer, the acetabular bed was medialized to the level  of the medial wall. Reamers were then directed posteriorly and superiorly into the sclerotic subchondral bone. Once good, bleeding cancellous bone was encountered in all four quadrants we stopped reaming. The corresponding size of the last reamer was  used to select a  Trident II hemispherical shell. This was then opened and impacted in approximately 40 degrees of abduction and 20 degrees of anteversion. One cancellous bone screw was placed in the posterior superior quadrant and the polyethylene liner  was then impacted into the shell. The retractors were then removed and attention was drawn towards the femur. The femur was delivered into the wound and a box chisel was placed into the piriformis fossa. A canal awl was placed down the canal without resistance.  Using the Accolade Broach System, the femur was prepared to accept a broach with good fill and rotational stability. With this in place, there was good rotational and axial stability. The broach was noted to seat at the level of the calcar resection.  No fractures were identified. Multiple trial head and neck combinations were then made and the reduced hip was stable to 90 degrees of flexion, 70 degrees of internal rotation, and 20 degrees of adduction. The hip could be fully extended, externally rotated,  and abducted without  any anterior dislocation. The leg lengths were restored equally. Intraoperative radiographs were then obtained which demonstrated satisfactory positioning of the components. No fractures were identified. Satisfied with the reconstruction,  the hip was dislocated and the femoral trials were removed. The final femoral stem and head were then impacted without complication. The hip was reduced one final time and all of the tissues were thoroughly irrigated. An anesthetic injection cocktail  was infiltrated throughout the soft tissues. The capsule was repaired anatomically with #1 Vicryl suture. The conjoint tendon was approximated to the posterior edge of the trochanter with #1 Vicryl suture and the gluteal fascia was repaired with interrupted  suture. The rest of the incision was closed in layers with a final layer of monocryl and skin glue. Occlusive dressing was then applied.  The drapes were then removed. The patient was then transferred to the PACU in stable condition. All counts were correct at the end of the case.     Specimen(s): none     Drains: none     Complications:  none     Plan:                         Weight Bearing Status:  WBAT Bilateral LE                        Range of Motion: Posterior hip precautions x6 weeks                        Wagner: none placed                        Dressing: Maintain for one week                        DVT Prophylaxis: Asa 81MG twice a day x 4 weeks                          Antibiotics: Continue x24 hours jono-operatively                         Discharge/Follow-up: Follow up in clinic in two weeks    Electronically signed by:  Juan Little DO  Joint Replacement and Adult Reconstructive Surgery  [facility]                                  Attestation:   Note Completion:  Attending Attestation I performed the procedure without a resident         Electronic Signatures:  Juan Little)  (Signed 16-Aug-2023 10:55)   Authored:  Post-Operative Note, Chart Review, Note Completion      Last Updated: 16-Aug-2023 10:55 by Juan Little (DO)

## 2023-10-02 ENCOUNTER — OFFICE VISIT (OUTPATIENT)
Dept: ORTHOPEDIC SURGERY | Facility: CLINIC | Age: 73
End: 2023-10-02
Payer: MEDICARE

## 2023-10-02 VITALS — WEIGHT: 200 LBS | BODY MASS INDEX: 32.14 KG/M2 | HEIGHT: 66 IN

## 2023-10-02 DIAGNOSIS — Z96.641 STATUS POST RIGHT HIP REPLACEMENT: Primary | ICD-10-CM

## 2023-10-02 PROCEDURE — 1159F MED LIST DOCD IN RCRD: CPT | Performed by: ORTHOPAEDIC SURGERY

## 2023-10-02 PROCEDURE — 1036F TOBACCO NON-USER: CPT | Performed by: ORTHOPAEDIC SURGERY

## 2023-10-02 PROCEDURE — 99024 POSTOP FOLLOW-UP VISIT: CPT | Performed by: ORTHOPAEDIC SURGERY

## 2023-10-02 NOTE — LETTER
October 2, 2023     Josafat Barillas MD  [address]  [facility], [address] [place] [id number]    Patient: Jaime Mcnair   YOB: 1950   Date of Visit: 10/2/2023       Dear Dr. Josafat Barillas MD:    Thank you for referring Jaime Mcnair to me for evaluation. Below are my notes for this consultation.  If you have questions, please do not hesitate to call me. I look forward to following your patient along with you.       Sincerely,     Juan Little, DO      CC: No Recipients  ______________________________________________________________________________________

## 2023-10-02 NOTE — PROGRESS NOTES
Chief complaint: 6 weeks S/P Right total hip arthroplasty DOS: 8/16/2023. He gets a little pain when he walks a lot, otherwise he is doing great.  He is walking unassisted now.    Surgery Performed: Right total hip arthroplasty  DOS: 8/16/2023    KRYSTIAN PAK is here for follow up of the above operation.  Doing very well.  Ambulates no assist device.  Is ready to begin outpatient physical therapy.  Minimal discomfort at this time.    Constitutional: See HPI for pain assessment, No significant weight loss, recent trauma. Denies fevers/chills  Cardiovascular: No chest pain, shortness of breath  Respiratory: No difficulty breathing, cough  Gastrointestinal: No nausea, vomiting, diarrhea, constipation  Musculoskeletal: Noted in HPI, no arthralgias   Integumentary: No rashes, easy bruising, redness   Neurological: no numbness or tingling in extremities, no gait disturbances     GENERAL: A/Ox3, NAD. Appears healthy, well nourished  CARDIAC: regular rate  LUNGS: Breathing non-labored    MUSCULOSKELETAL:  Laterality: Right Hip exam  - Strength: Abduction 5/5, Flexion 5/5  - Palpation: non TTP along surgical site  - Incision: Well-healed  - EHL/PF/DF motor intact  - compartments soft, negative homans  - Gait: Using no assistive device    NEUROVASCULAR:  - Neurovascular Status: sensation intact to light touch distally  - Capillary refill brisk at extremities, Bilateral dorsalis pedis pulse 2+    Impression: S/P right total hip arthroplasty, date of surgery 8/16/2023    Plan:  -Weightbearing as tolerated  -No need for further DVT prophylaxis  -Continue physical therapy, order for outpatient physical therapy given  -Follow-up in 6 weeks with x-rays    Juan Little DO  Attending Surgeon  Joint Replacement and Adult Reconstructive Surgery  [place], [place]

## 2023-10-03 ENCOUNTER — EVALUATION (OUTPATIENT)
Dept: PHYSICAL THERAPY | Facility: HOSPITAL | Age: 73
End: 2023-10-03
Payer: MEDICARE

## 2023-10-03 DIAGNOSIS — Z96.641 HISTORY OF RIGHT HIP REPLACEMENT: ICD-10-CM

## 2023-10-03 DIAGNOSIS — R29.898 WEAKNESS OF LOWER EXTREMITY, UNSPECIFIED LATERALITY: Primary | ICD-10-CM

## 2023-10-03 PROCEDURE — 97110 THERAPEUTIC EXERCISES: CPT | Mod: GP | Performed by: PHYSICAL THERAPIST

## 2023-10-03 PROCEDURE — 97161 PT EVAL LOW COMPLEX 20 MIN: CPT | Mod: GP | Performed by: PHYSICAL THERAPIST

## 2023-10-03 ASSESSMENT — ENCOUNTER SYMPTOMS
LOSS OF SENSATION IN FEET: 1
DEPRESSION: 0
OCCASIONAL FEELINGS OF UNSTEADINESS: 0

## 2023-10-03 ASSESSMENT — PAIN - FUNCTIONAL ASSESSMENT: PAIN_FUNCTIONAL_ASSESSMENT: 0-10

## 2023-10-03 ASSESSMENT — PATIENT HEALTH QUESTIONNAIRE - PHQ9
1. LITTLE INTEREST OR PLEASURE IN DOING THINGS: NOT AT ALL
2. FEELING DOWN, DEPRESSED OR HOPELESS: NOT AT ALL
SUM OF ALL RESPONSES TO PHQ9 QUESTIONS 1 AND 2: 0

## 2023-10-03 ASSESSMENT — PAIN SCALES - GENERAL: PAINLEVEL_OUTOF10: 0 - NO PAIN

## 2023-10-03 NOTE — LETTER
October 3, 2023     Patient: Jaime Mcnair   YOB: 1950   Date of Visit: 10/3/2023       To Whom it May Concern:    Jaime Mcnair was seen in my clinic on 10/3/2023. He {Return to school/sport:84438}.    If you have any questions or concerns, please don't hesitate to call.         Sincerely,          Marian Lowe, PT        CC: No Recipients

## 2023-10-03 NOTE — Clinical Note
October 3, 2023    Marian Lowe, PT  [address] [facility]  [facility] [address]    Patient: Jaime Mcnair   YOB: 1950   Date of Visit: 10/3/2023       Dear Juan Little Do  [address] [address]    The attached plan of care is being sent to you because your patient’s medical reimbursement requires that you certify the plan of care. Your signature is required to allow uninterrupted insurance coverage.      You may indicate your approval by signing below and faxing this form back to us at Dept Fax: [phone].    Please call Dept: [phone] with any questions or concerns.    Thank you for this referral,        Marian Lowe PT  [place] [id number] [facility] [address]  [phone]    Payer: Payor: [employer] MEDICARE / Plan: [employer] MEDICARE VALUE PLAN / Product Type: *No Product type* /                                                                         Date:     Dear Marian Lowe PT,     Re: Mr. Jaime Mcnair, MRN:[id number]    I certify that I have reviewed the attached plan of care and it is medically necessary for Mr. Jaime Mcnair (1950) who is under my care.          ______________________________________                    _________________  Provider name and credentials                                           Date and time                                                                                           Plan of Care 10/3/23   Effective from: 10/3/2023  Effective to: 12/5/2023    Plan ID: [id number]            Participants as of Finalize on 10/3/2023    Name Type Comments Contact Info    Juan Little DO Referring Provider  [phone]    Marian Lowe PT Physical Therapist  [phone]       Last Plan Note     Author: Marian Lowe PT Status: Signed Last edited: 10/3/2023 11:30 AM       Physical Therapy    Physical Therapy Evaluation    Patient Name: Jaime Mcnair  MRN:  [id number]  Today's Date: 10/3/2023  Time Calculation  Start Time: 1135  Stop Time: 1225  Time Calculation (min): 50 min    Assessment  Pt. Is a 74 y/o male with s/p R THR on 8-16-23. Pt. Is with decreased posture, decreased R hip ROM with limitations dressing, and decreased strength. Pt. Would benefit from skilled PT to address the above issues.      Plan  Skilled PT 2x/week x 4 -6 weeks.   There ex, there act, manual, ifc, gait training, balance training.          Subjective   Pt. With ESSIE 8-  Pt. States his restrictions are now gone.   Pt. Had home PT and is now walking without an assistive device. Pt. Does use his walker or cane on occasion.   Pt. Just began using the shower while his wife is there. Pt. Is now dressing but his wife is putting on R shoe and sock.   Pt. Is going up and down stairs on the porch alright.   Pt. Used to work on lawn mowers as a hobby that he would like to return to.   Pt. Goal is his walking will get better.   General:  General  Reason for Referral: s/p R hip ESSIE  Referred By: Dr. 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, HTN, hernia repair, cataract,       Pain:  Pain Assessment: 0-10  Pain Score: 0 - No pain  Pain Location: Hip  Pain Orientation: Right  Home Living:  Apt with 5 steps to a porch   Prior Function Per Pt/Caregiver Report:  Level of [place]: Independent with ADLs and functional transfers  Receives Help From:  (indep)    Objective         Posture:  Forward bent at lumbar, QUAN knee valgus,  R LE shorter, R knee lacking full extension by ~5 degrees  Range of Motion:  R hip AROM:   Flex 100   Abd: 40    ER: 10  Strength:  QUAN LE strength:   All wnl except: hip R 4/5     Flexibility:   QUAN calf: +10~  Palpation:  TTT R hip area mildly.         Gait:  Pt. Amb. Indep with forward bent posture with QUAN knee valgus and R IR.              Other:     EXERCISES Date10-3-23 Date Date Date    Visits :1 REPS REPS REPS     Eval and HEP      Nustep       Bike              Shuttle  DLP       Shuttle SLP       Shuttle TR/HR              Qhip Flexion       Qhip Extension       Qhip Abduction       Qhip  TKE              Q Quad       Q Hamstring                                                                                                                   Outcome Measures:   LEFS : 46    OP EDUCATION:  Access Code: [email or web address]  URL: [email or web address]  Date: 10/03/2023  Prepared by: Marian Lowe    Exercises  - Hooklying Single Knee to Chest  - 2 x daily - 7 x weekly - 5 reps - 10sec hold  - Standing Hip Abduction with Counter Support  - 1 x daily - 7 x weekly - 3 sets - 10 reps  - Standing March with Counter Support  - 1 x daily - 7 x weekly - 3 sets - 10 reps  - Standing Hip Extension with Counter Support  - 1 x daily - 7 x weekly - 3 sets - 10 reps     Goals:  Encounter Problems       Encounter Problems (Active)       PT Problem       Z96.641 (ICD-10-CM) - History of right hip replacement      PT Goal 1       Start:  10/03/23    Expected End:  12/05/23       STG by 2 weeks.  1. Pt. Will c/o less than 1/10 R hip pain with >= 30 min of exercise.     LTG by discharge:  1. Pt. Will be indep with HeP.    2. Pt. Will increase R hip strength to wnl to allow wfl mobility.    3. Pt. Will amb. Indep x 300 feet with increase R LE posture.                                 Current Participants as of 10/3/2023    Name Type Comments Contact Info    Juan Little DO Referring Provider  [phone]    Signature pending    Marian Lowe PT Physical Therapist  [phone]    Signature pending

## 2023-10-03 NOTE — PROGRESS NOTES
Physical Therapy    Physical Therapy Evaluation    Patient Name: Jaime Mcnair  MRN: [id number]  Today's Date: 10/12/2023  Time Calculation  Start Time: 1135  Stop Time: 1225  Time Calculation (min): 50 min    Assessment  Pt. Is a 74 y/o male with s/p R THR on 8-16-23. Pt. Is with decreased posture, decreased R hip ROM with limitations dressing, and decreased strength. Pt. Would benefit from skilled PT to address the above issues.      Plan  Skilled PT 2x/week x 4 -6 weeks.   There ex, there act, manual, ifc, gait training, balance training.          Subjective   Pt. With ESSIE 8-  Pt. States his restrictions are now gone.   Pt. Had home PT and is now walking without an assistive device. Pt. Does use his walker or cane on occasion.   Pt. Just began using the shower while his wife is there. Pt. Is now dressing but his wife is putting on R shoe and sock.   Pt. Is going up and down stairs on the porch alright.   Pt. Used to work on lawn mowers as a hobby that he would like to return to.   Pt. Goal is his walking will get better.   General:  General  Reason for Referral: s/p R hip ESSIE  Referred By: Dr. 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, HTN, hernia repair, cataract,       Pain:  Pain Assessment: 0-10  Pain Score: 0 - No pain  Pain Location: Hip  Pain Orientation: Right  Home Living:  Apt with 5 steps to a porch   Prior Function Per Pt/Caregiver Report:  Level of [place]: Independent with ADLs and functional transfers  Receives Help From:  (indep)    Objective         Posture:  Forward bent at lumbar, QUAN knee valgus,  R LE shorter, R knee lacking full extension by ~5 degrees  Range of Motion:  R hip AROM:   Flex 100   Abd: 40    ER: 10  Strength:  QUAN LE strength:   All wnl except: hip R 4/5     Flexibility:   QUAN calf: +10~  Palpation:  TTT R hip area mildly.         Gait:  Pt. Amb. Indep with forward bent posture with QUAN knee valgus and R IR.               Other:     EXERCISES Date10-3-23 Date Date Date    Visits :1 REPS REPS REPS    Eval and HEP      Nustep       Bike              Shuttle  DLP       Shuttle SLP       Shuttle TR/HR              Qhip Flexion       Qhip Extension       Qhip Abduction       Qhip  TKE              Q Quad       Q Hamstring                                                                                                                   Outcome Measures:   LEFS : 46    OP EDUCATION:  Access Code: [email or web address]  URL: [email or web address]  Date: 10/03/2023  Prepared by: Marian Lowe    Exercises  - Hooklying Single Knee to Chest  - 2 x daily - 7 x weekly - 5 reps - 10sec hold  - Standing Hip Abduction with Counter Support  - 1 x daily - 7 x weekly - 3 sets - 10 reps  - Standing March with Counter Support  - 1 x daily - 7 x weekly - 3 sets - 10 reps  - Standing Hip Extension with Counter Support  - 1 x daily - 7 x weekly - 3 sets - 10 reps     Goals:  Encounter Problems       Encounter Problems (Active)       PT Problem       Z96.641 (ICD-10-CM) - History of right hip replacement      PT Goal 1       Start:  10/03/23    Expected End:  12/05/23       STG by 2 weeks.  1. Pt. Will c/o less than 1/10 R hip pain with >= 30 min of exercise.     LTG by discharge:  1. Pt. Will be indep with HeP.    2. Pt. Will increase R hip strength to wnl to allow wfl mobility.    3. Pt. Will amb. Indep x 300 feet with increase R LE posture.               outpatient Problems       outpatient Problems (Active)       Z96.641 (ICD-10-CM) - History of right hip replacement        STG by 2 weeks. 1. Pt. Will c/o less than 1/10 R hip pain with >= 30 min of exercise.        Start:  10/06/23    Expected End:  10/17/23            LTG by discharge: 1. Pt. Will be indep with HeP.       Start:  10/06/23    Expected End:  01/06/24            2. Pt. Will increase R hip strength to wnl to allow wfl mobility.       Start:  10/06/23    Expected End:   01/06/24            3. Pt. Will amb. Indep x 300 feet with increase R LE posture.        Start:  10/06/23    Expected End:  01/06/24

## 2023-10-03 NOTE — LETTER
October 3, 2023     Patient: Jaime Mcnair   YOB: 1950   Date of Visit: 10/3/2023       To Whom It May Concern:    It is my medical opinion that Jaime Mcnair {Work release (duty restriction):19587}.    If you have any questions or concerns, please don't hesitate to call.         Sincerely,        Marian Lowe, PT    CC: No Recipients

## 2023-10-05 ENCOUNTER — HOSPITAL ENCOUNTER (OUTPATIENT)
Facility: HOSPITAL | Age: 73
Setting detail: OUTPATIENT SURGERY
Discharge: HOME | End: 2023-10-05
Attending: STUDENT IN AN ORGANIZED HEALTH CARE EDUCATION/TRAINING PROGRAM | Admitting: STUDENT IN AN ORGANIZED HEALTH CARE EDUCATION/TRAINING PROGRAM
Payer: MEDICARE

## 2023-10-05 VITALS
RESPIRATION RATE: 16 BRPM | HEIGHT: 68 IN | DIASTOLIC BLOOD PRESSURE: 93 MMHG | BODY MASS INDEX: 28.79 KG/M2 | OXYGEN SATURATION: 96 % | TEMPERATURE: 97.7 F | WEIGHT: 190 LBS | HEART RATE: 93 BPM | SYSTOLIC BLOOD PRESSURE: 137 MMHG

## 2023-10-05 DIAGNOSIS — Z85.51 HISTORY OF BLADDER CANCER: Primary | ICD-10-CM

## 2023-10-05 PROCEDURE — 7100000009 HC PHASE TWO TIME - INITIAL BASE CHARGE: Performed by: STUDENT IN AN ORGANIZED HEALTH CARE EDUCATION/TRAINING PROGRAM

## 2023-10-05 PROCEDURE — 2500000004 HC RX 250 GENERAL PHARMACY W/ HCPCS (ALT 636 FOR OP/ED): Performed by: STUDENT IN AN ORGANIZED HEALTH CARE EDUCATION/TRAINING PROGRAM

## 2023-10-05 PROCEDURE — 3600000003 HC OR TIME - INITIAL BASE CHARGE - PROCEDURE LEVEL THREE: Performed by: STUDENT IN AN ORGANIZED HEALTH CARE EDUCATION/TRAINING PROGRAM

## 2023-10-05 PROCEDURE — 3600000008 HC OR TIME - EACH INCREMENTAL 1 MINUTE - PROCEDURE LEVEL THREE: Performed by: STUDENT IN AN ORGANIZED HEALTH CARE EDUCATION/TRAINING PROGRAM

## 2023-10-05 PROCEDURE — 52000 CYSTOURETHROSCOPY: CPT | Performed by: STUDENT IN AN ORGANIZED HEALTH CARE EDUCATION/TRAINING PROGRAM

## 2023-10-05 PROCEDURE — 7100000010 HC PHASE TWO TIME - EACH INCREMENTAL 1 MINUTE: Performed by: STUDENT IN AN ORGANIZED HEALTH CARE EDUCATION/TRAINING PROGRAM

## 2023-10-05 PROCEDURE — 2500000001 HC RX 250 WO HCPCS SELF ADMINISTERED DRUGS (ALT 637 FOR MEDICARE OP): Performed by: STUDENT IN AN ORGANIZED HEALTH CARE EDUCATION/TRAINING PROGRAM

## 2023-10-05 RX ORDER — LIDOCAINE HYDROCHLORIDE 20 MG/ML
JELLY TOPICAL AS NEEDED
Status: DISCONTINUED | OUTPATIENT
Start: 2023-10-05 | End: 2023-10-05 | Stop reason: HOSPADM

## 2023-10-05 RX ORDER — SULFAMETHOXAZOLE AND TRIMETHOPRIM 800; 160 MG/1; MG/1
160 TABLET ORAL ONCE
Status: COMPLETED | OUTPATIENT
Start: 2023-10-05 | End: 2023-10-05

## 2023-10-05 RX ADMIN — SULFAMETHOXAZOLE AND TRIMETHOPRIM 160 MG: 800; 160 TABLET ORAL at 07:28

## 2023-10-05 ASSESSMENT — PAIN SCALES - GENERAL
PAINLEVEL_OUTOF10: 0 - NO PAIN
PAINLEVEL_OUTOF10: 2
PAINLEVEL_OUTOF10: 0 - NO PAIN
PAINLEVEL_OUTOF10: 0 - NO PAIN

## 2023-10-05 ASSESSMENT — PAIN - FUNCTIONAL ASSESSMENT
PAIN_FUNCTIONAL_ASSESSMENT: 0-10
PAIN_FUNCTIONAL_ASSESSMENT: 0-10

## 2023-10-05 ASSESSMENT — COLUMBIA-SUICIDE SEVERITY RATING SCALE - C-SSRS
1. IN THE PAST MONTH, HAVE YOU WISHED YOU WERE DEAD OR WISHED YOU COULD GO TO SLEEP AND NOT WAKE UP?: NO
6. HAVE YOU EVER DONE ANYTHING, STARTED TO DO ANYTHING, OR PREPARED TO DO ANYTHING TO END YOUR LIFE?: NO
2. HAVE YOU ACTUALLY HAD ANY THOUGHTS OF KILLING YOURSELF?: NO

## 2023-10-05 NOTE — DISCHARGE INSTRUCTIONS
Cystoscopy Discharge Instructions:    Please call attending physician or hospital  with questions.  Please call or present to ED for fever >101 F, intractable nausea and vomiting, or uncontrolled pain.  OK to shower after discharge.

## 2023-10-05 NOTE — H&P
History Of Present Illness  Jaime Mcnair is a 73 y.o. male presenting with history of bladder ca.  Here today for cystoscopy.      Past Medical History  He has a past medical history of GERD (gastroesophageal reflux disease) and Hypertension.    Surgical History  He has a past surgical history that includes Other surgical history (08/26/2019); Other surgical history (08/26/2019); Other surgical history (08/26/2019); IR injection joint (07/28/2020); IR injection joint (03/25/2021); IR injection joint (08/12/2021); IR injection joint (02/11/2022); IR injection joint (09/08/2022); IR injection joint (03/09/2023); and Bladder tumor excision.     Social History  He reports that he has never smoked. He has never used smokeless tobacco. He reports that he does not currently use alcohol. He reports that he does not use drugs.    Family History  Family History   Problem Relation Name Age of Onset    Throat cancer Mother      Hypertension Father      Other (bladder cancer) Father      Breast cancer Neg Hx          Allergies  Benicar [olmesartan], Omeprazole, Pepto-bismol [bismuth subsalicylate], and Penicillins    Review of Systems   All other systems reviewed and are negative.       Physical Exam  Cardiovascular:      Rate and Rhythm: Normal rate.   Pulmonary:      Effort: Pulmonary effort is normal.   Musculoskeletal:      Cervical back: Normal range of motion.   Neurological:      Mental Status: He is alert.             Assessment:  History of bladder ca    Plan:   Local cystoscopy for surveillance          Daryl Bourne MD

## 2023-10-05 NOTE — OP NOTE
cystoscopy Operative Note     Date: 10/5/2023  OR Location: [facility]    Name: Jaime Mcnair, : 1950, Age: 73 y.o., MRN: [id number], Sex: male    Diagnosis  Pre-op Diagnosis     * Malignant neoplasm of bladder, unspecified (CMS/HCC) [C67.9] Post-op Diagnosis     * Malignant neoplasm of bladder, unspecified (CMS/HCC) [C67.9]     Procedures    * cystoscopy    Surgeons      * Daryl Bourne - Primary    Resident/Fellow/Other Assistant:  No surgical staff documented.    Procedure Summary  Anesthesia: Local  ASA: ASA status not filed in the log.  Anesthesia Staff: CRNA: RACHAEL Wood-CRNA  Estimated Blood Loss: 0mL  Intra-op Medications: * No intraprocedure medications in log *      Intraprocedure I/O Totals       None           Specimen: No specimens collected     Staff:   Circulator: Trinh Sidhu RN; Adamaris Kearns RN  Scrub Person: Leo Davis RN         Drains and/or Catheters: * None in log *      Findings: Normal anatomy     Indications: Jaime Mcnair is an 73 y.o. male who is having surgery for Malignant neoplasm of bladder, unspecified (CMS/HCC) [C67.9]. Surveillance cystoscopy today.     The patient was seen in the preoperative area. The risks, benefits, complications, treatment options, non-operative alternatives, expected recovery and outcomes were discussed with the patient. Single dose PO Bactrim given for prophylaxis.     Procedure Details: 16 F flexible cystoscopy assembled and passed per urethra.  Anterior urethra normal.  Prostatic urethra 3 cm with nonobstructing bilobar hypertrophy.  Grade 2 trabeculation diffusely.  Ureteral orifices orthotopic with clear efflux of urine.  The posterior wall, dome, left lateral wall, right lateral wall, bladder neck were all surveyed in their entirety.  No evidence of papillary recurrence.  Mild neovascularization at the intravesical prostate.  Patient tolerated very well.  Scope was removed.  Patient taken to recovery in stable condition.  Complications:   None; patient tolerated the procedure well.    Disposition: PACU - hemodynamically stable.  Condition: stable           Attending Attestation: I performed the procedure.    Daryl Bourne  Phone Number: [phone]

## 2023-10-06 ENCOUNTER — TREATMENT (OUTPATIENT)
Dept: PHYSICAL THERAPY | Facility: HOSPITAL | Age: 73
End: 2023-10-06
Payer: MEDICARE

## 2023-10-06 DIAGNOSIS — R29.898 WEAKNESS OF LOWER EXTREMITY, UNSPECIFIED LATERALITY: Primary | ICD-10-CM

## 2023-10-06 DIAGNOSIS — Z96.641 HISTORY OF RIGHT HIP REPLACEMENT: ICD-10-CM

## 2023-10-06 PROCEDURE — 97110 THERAPEUTIC EXERCISES: CPT | Mod: GP | Performed by: PHYSICAL THERAPIST

## 2023-10-06 ASSESSMENT — PAIN - FUNCTIONAL ASSESSMENT: PAIN_FUNCTIONAL_ASSESSMENT: 0-10

## 2023-10-06 ASSESSMENT — PAIN SCALES - GENERAL
PAINLEVEL_OUTOF10: 0 - NO PAIN
PAINLEVEL_OUTOF10: 1

## 2023-10-06 NOTE — PROGRESS NOTES
"     Physical Therapy    Physical Therapy Treatment    Patient Name: Jaime Mcnair  MRN: [id number]  Today's Date: 10/12/2023  Time Calculation  Start Time: 1300  Stop Time: 1345  Time Calculation (min): 45 min      Assessment:  PT Assessment  Rehab Prognosis: Good  Evaluation/Treatment Tolerance: Patient tolerated treatment well    Plan:  OP PT Plan  Treatment/Interventions: Aquatic therapy, Cryotherapy, Education/ Instruction, Hot pack, Gait training, Manual therapy, Prosthetic management/ training, Therapeutic exercises, Therapeutic activities  PT Plan: Skilled PT  Rehab Potential: Good  Plan of Care Agreement: Patient    Current Problem  1. Weakness of lower extremity, unspecified laterality        2. History of right hip replacement  Follow Up In Physical Therapy          Subjective   Pt. States he is doing well. He had a scope procedure yesterday.      Precautions  Precautions  STEADI Fall Risk Score (The score of 4 or more indicates an increased risk of falling): NO CHANGE  Vital Signs     Pain  Pain Assessment: 0-10  Pain Score: 0 - No pain  Pain Location: Hip  Pain Orientation: Right    Objective     Increase therEx today.     Treatments:      EXERCISES [id number] Date 10-6-23 Date Date    Visits :1 Visit: 2 REPS REPS   RKBD STR.  Eval and HEP 30\"X3     Nustep  L2 10'     Step ups  fwd  5\" step 10x2 ea                     lat  5\" STEP 10X2 EA     Shuttle  DLP  5B 10X2     Shuttle SLP  4B 10X2     Shuttle TR/HR              Qhip Flexion       Qhip Extension       Qhip Abduction       Qhip  TKE              Q Quad       Q Hamstring               BALL FLEX DKTC  10X2            Side amb  2laps     Back amb  2laps                                     OP EDUCATION:  CONT HEP      Goals:  Encounter Problems (Active)       PT Problem       Z96.641 (ICD-10-CM) - History of right hip replacement      PT Goal 1       Start:  10/03/23    Expected End:  12/05/23       STG by 2 weeks.  1. Pt. Will c/o less than 1/10 R hip " pain with >= 30 min of exercise.     LTG by discharge:  1. Pt. Will be indep with HeP.    2. Pt. Will increase R hip strength to wnl to allow wfl mobility.    3. Pt. Will amb. Indep x 300 feet with increase R LE posture.            Active       Z96.641 (ICD-10-CM) - History of right hip replacement        STG by 2 weeks. 1. Pt. Will c/o less than 1/10 R hip pain with >= 30 min of exercise.        Start:  10/06/23    Expected End:  10/17/23            LTG by discharge: 1. Pt. Will be indep with HeP.       Start:  10/06/23    Expected End:  01/06/24            2. Pt. Will increase R hip strength to wnl to allow wfl mobility.       Start:  10/06/23    Expected End:  01/06/24            3. Pt. Will amb. Indep x 300 feet with increase R LE posture.        Start:  10/06/23    Expected End:  01/06/24

## 2023-10-11 ENCOUNTER — TREATMENT (OUTPATIENT)
Dept: PHYSICAL THERAPY | Facility: HOSPITAL | Age: 73
End: 2023-10-11
Payer: MEDICARE

## 2023-10-11 DIAGNOSIS — Z96.641 HISTORY OF RIGHT HIP REPLACEMENT: ICD-10-CM

## 2023-10-11 DIAGNOSIS — R29.898 WEAKNESS OF LOWER EXTREMITY, UNSPECIFIED LATERALITY: Primary | ICD-10-CM

## 2023-10-11 PROCEDURE — 97110 THERAPEUTIC EXERCISES: CPT | Mod: GP,CQ

## 2023-10-11 ASSESSMENT — PAIN SCALES - GENERAL: PAINLEVEL_OUTOF10: 1

## 2023-10-11 ASSESSMENT — PAIN - FUNCTIONAL ASSESSMENT: PAIN_FUNCTIONAL_ASSESSMENT: 0-10

## 2023-10-11 NOTE — PROGRESS NOTES
"Physical Therapy    Physical Therapy Treatment    Patient Name: Jaime Mcnair  MRN: [id number]  Today's Date: 10/12/2023  Time Calculation  Start Time: 1000  Stop Time: 1045  Time Calculation (min): 45 min  Visit #3    Assessment:     Pt requires v.c to perform exercises correctly    Plan: Cont to improve strength of hip  OP PT Plan  Treatment/Interventions: Aquatic therapy, Cryotherapy, Education/ Instruction, Hot pack, Gait training, Manual therapy, Prosthetic management/ training, Therapeutic exercises, Therapeutic activities  PT Plan: Skilled PT  Rehab Potential: Good  Plan of Care Agreement: Patient    Current Problem  1. Weakness of lower extremity, unspecified laterality        2. History of right hip replacement  Follow Up In Physical Therapy          Subjective   Pt reports he has a little bit of pain where they did surgery.  Pt is doing his HEP      Precautions  Precautions  STEADI Fall Risk Score (The score of 4 or more indicates an increased risk of falling): No change       Pain  Pain Assessment: 0-10  Pain Score: 1  Pain Location: Hip  Pain Orientation: Right    Objective     Advanced exercises    Treatments:  EXERCISES Date10-3-23 Date 10-6-23 Date 10/12/2023   Date   VISITS #1 #2 #3     REPS REPS REPS REPS   RKBD STR.  Eval and HEP 30\"X3 30\" x 3    Nustep  L2 10' L2 10 min    Step ups  fwd  5\" step 10x2 ea 5\" step 10x2 ea                    lat  5\" STEP 10X2 EA 5\" step 10x2 ea           Shuttle  DLP  5B 10X2 5B 10 x 2     Shuttle SLP  4B 10X2 4B 10 x 2    Shuttle TR/HR              Qhip Flexion   5# 10 x each    Qhip Extension   Bars 10 x each    Qhip Abduction   5# 10 x each                                       BALL FLEX DKTC  10X2            Side amb  2laps 2 Laps    Back amb  2laps 2 laps                                    Goals:  Active       Z96.641 (ICD-10-CM) - History of right hip replacement        STG by 2 weeks. 1. Pt. Will c/o less than 1/10 R hip pain with >= 30 min of exercise.        " Start:  10/06/23    Expected End:  10/17/23            LTG by discharge: 1. Pt. Will be indep with HeP.       Start:  10/06/23    Expected End:  01/06/24            2. Pt. Will increase R hip strength to wnl to allow wfl mobility.       Start:  10/06/23    Expected End:  01/06/24            3. Pt. Will amb. Indep x 300 feet with increase R LE posture.        Start:  10/06/23    Expected End:  01/06/24               Start:  10/03/23    Expected End:  12/05/23      STG by 2 weeks.  1. Pt. Will c/o less than 1/10 R hip pain with >= 30 min of exercise.   Progressing 10/12/2023      LTG by discharge:  1. Pt. Will be indep with HeP.    2. Pt. Will increase R hip strength to wnl to allow wfl mobility.  Progressing 10/12/2023      3. Pt. Will amb. Indep x 300 feet with increase R LE posture.

## 2023-10-12 PROBLEM — R29.898 LEG WEAKNESS: Status: ACTIVE | Noted: 2023-10-12

## 2023-10-17 ENCOUNTER — TREATMENT (OUTPATIENT)
Dept: PHYSICAL THERAPY | Facility: HOSPITAL | Age: 73
End: 2023-10-17
Payer: MEDICARE

## 2023-10-17 DIAGNOSIS — R29.898 WEAKNESS OF LOWER EXTREMITY, UNSPECIFIED LATERALITY: Primary | ICD-10-CM

## 2023-10-17 DIAGNOSIS — Z96.641 HISTORY OF RIGHT HIP REPLACEMENT: ICD-10-CM

## 2023-10-17 PROCEDURE — 97110 THERAPEUTIC EXERCISES: CPT | Mod: GP | Performed by: PHYSICAL THERAPIST

## 2023-10-17 ASSESSMENT — PAIN SCALES - GENERAL: PAINLEVEL_OUTOF10: 1

## 2023-10-17 ASSESSMENT — PAIN - FUNCTIONAL ASSESSMENT: PAIN_FUNCTIONAL_ASSESSMENT: 0-10

## 2023-10-17 NOTE — PROGRESS NOTES
"Physical Therapy    Physical Therapy Treatment    Patient Name: Jaime Mcnair  MRN: [id number]  Today's Date: 10/17/2023  Time Calculation  Start Time: 1153  Stop Time: 1238  Time Calculation (min): 45 min  Visit #3    Assessment:  PT Assessment  Rehab Prognosis: Good  Evaluation/Treatment Tolerance:  (pt. was able to complete all increases in reps with little difficulty.)  Pt requires v.c to perform exercises correctly    Plan: Cont to improve strength of hip       Current Problem  1. Weakness of lower extremity, unspecified laterality        2. History of right hip replacement  Follow Up In Physical Therapy          Subjective   Pt reports he has a little bit of pain where they did surgery.  Pt is doing his HEP      Precautions  Precautions  STEADI Fall Risk Score (The score of 4 or more indicates an increased risk of falling): no change       Pain  Pain Assessment: 0-10  Pain Score: 1  Pain Type: Acute pain  Pain Location: Hip  Pain Orientation: Right    Objective     Advanced reps of exercises    Treatments:  EXERCISES Date10-3-23 Date 10-6-23 Date 10/17/2023   Date 10/17/2023     VISITS #1 #2 #3 4    REPS REPS REPS REPS   RKBD STR.  Eval and HEP 30\"X3 30\" x 3 30\"x3   Nustep  L2 10' L2 10 min L3 10'   Step ups  fwd  5\" step 10x2 ea 5\" step 10x2 ea 5\" STEP 15X2ea                   lat  5\" STEP 10X2 EA 5\" step 10x2 ea 5\" 15x2ea          Shuttle  DLP  5B 10X2 5B 10 x 2  5B15x2   Shuttle SLP  4B 10X2 4B 10 x 2 5B 15x2   Shuttle TR/HR              Qhip Flexion   5# 10 x each 5# 10x2 ea   Qhip Extension   Bars 10 x each Bars 20x2 ea.   Qhip Abduction   5# 10 x each 5# 10x2 ea                                      BALL FLEX DKTC  10X2            Side amb  2laps 2 Laps 3laps   Back amb  2laps 2 laps 3laps                                   Goals:  Active       Z96.641 (ICD-10-CM) - History of right hip replacement        STG by 2 weeks. 1. Pt. Will c/o less than 1/10 R hip pain with >= 30 min of exercise.  (Progressing)  "      Start:  10/06/23    Expected End:  10/17/23            LTG by discharge: 1. Pt. Will be indep with HeP.       Start:  10/06/23    Expected End:  01/06/24            2. Pt. Will increase R hip strength to wnl to allow wfl mobility.       Start:  10/06/23    Expected End:  01/06/24            3. Pt. Will amb. Indep x 300 feet with increase R LE posture.        Start:  10/06/23    Expected End:  01/06/24               Start:  10/03/23    Expected End:  12/05/23      STG by 2 weeks.  1. Pt. Will c/o less than 1/10 R hip pain with >= 30 min of exercise.   Progressing 10/17/2023      LTG by discharge:  1. Pt. Will be indep with HeP.    2. Pt. Will increase R hip strength to wnl to allow wfl mobility.  Progressing 10/17/2023      3. Pt. Will amb. Indep x 300 feet with increase R LE posture.

## 2023-10-19 ENCOUNTER — TREATMENT (OUTPATIENT)
Dept: PHYSICAL THERAPY | Facility: HOSPITAL | Age: 73
End: 2023-10-19
Payer: MEDICARE

## 2023-10-19 DIAGNOSIS — R29.898 WEAKNESS OF BOTH LOWER EXTREMITIES: Primary | ICD-10-CM

## 2023-10-19 DIAGNOSIS — Z96.641 HISTORY OF RIGHT HIP REPLACEMENT: ICD-10-CM

## 2023-10-19 PROCEDURE — 97110 THERAPEUTIC EXERCISES: CPT | Mod: GP,CQ | Performed by: PHYSICAL THERAPY ASSISTANT

## 2023-10-19 ASSESSMENT — PAIN SCALES - GENERAL: PAINLEVEL_OUTOF10: 0 - NO PAIN

## 2023-10-19 ASSESSMENT — PAIN - FUNCTIONAL ASSESSMENT: PAIN_FUNCTIONAL_ASSESSMENT: 0-10

## 2023-10-19 NOTE — PROGRESS NOTES
"Physical Therapy    Physical Therapy Treatment    Patient Name: Jaime Mcnair  MRN: [id number]  Today's Date: 10/19/2023  Time Calculation  Start Time: 1445  Stop Time: 1523  Time Calculation (min): 38 min        Assessment:  Pt requires minimal cues for posture, pacing and proper technique.     Plan: Continue to progress hip strengthening to improve gait  and functional mobility.    Current Problem  1. Weakness of both lower extremities        2. History of right hip replacement  Follow Up In Physical Therapy          Subjective   General   Pt voiced no concerns this date.  Reports Hip feels good, \"just a tiny bit, not even able to rate\"  Precautions  Precautions  STEADI Fall Risk Score (The score of 4 or more indicates an increased risk of falling): no change       Pain  Pain Assessment: 0-10  Pain Score: 0 - No pain  Pain Type: Acute pain  Pain Location: Hip  Pain Orientation: Right    Objective       Treatments:  Therapeutic Exercise  Therapeutic Exercise Performed: Yes  EXERCISES Date:  10-19-23 Date  Date    Date      VISITS #5       REPS REPS REPS REPS   RKBD STR.  30\" x 3      Nustep L3 x 10'      Step ups  fwd 5\" 2 x 15 each                      lat 5\" 2 x 15 each             Shuttle  DLP 5B 2 x 15      Shuttle SLP 5B 2 x 15      Shuttle TR/HR              Qhip Flexion 5# 2 x 10 each      Qhip Extension Bars 2 x 20 each      Qhip Abduction 5# 2 x 10 each                                         BALL FLEX DKTC              Side amb 3 laps      Back amb 3 laps                                      OP EDUCATION:       Goals:  Active       Z96.641 (ICD-10-CM) - History of right hip replacement        STG by 2 weeks. 1. Pt. Will c/o less than 1/10 R hip pain with >= 30 min of exercise.  (Progressing)       Start:  10/06/23    Expected End:  10/17/23            LTG by discharge: 1. Pt. Will be indep with HeP.       Start:  10/06/23    Expected End:  01/06/24            2. Pt. Will increase R hip strength to wnl to " allow wfl mobility.       Start:  10/06/23    Expected End:  01/06/24            3. Pt. Will amb. Indep x 300 feet with increase R LE posture.        Start:  10/06/23    Expected End:  01/06/24

## 2023-10-24 ENCOUNTER — TREATMENT (OUTPATIENT)
Dept: PHYSICAL THERAPY | Facility: HOSPITAL | Age: 73
End: 2023-10-24
Payer: MEDICARE

## 2023-10-24 DIAGNOSIS — R29.898 WEAKNESS OF LOWER EXTREMITY, UNSPECIFIED LATERALITY: Primary | ICD-10-CM

## 2023-10-24 DIAGNOSIS — Z96.641 HISTORY OF RIGHT HIP REPLACEMENT: ICD-10-CM

## 2023-10-24 PROCEDURE — 97110 THERAPEUTIC EXERCISES: CPT | Mod: GP,CQ

## 2023-10-24 ASSESSMENT — PAIN - FUNCTIONAL ASSESSMENT: PAIN_FUNCTIONAL_ASSESSMENT: 0-10

## 2023-10-24 ASSESSMENT — PAIN SCALES - GENERAL: PAINLEVEL_OUTOF10: 1

## 2023-10-24 NOTE — PROGRESS NOTES
"Physical Therapy    Physical Therapy Treatment    Patient Name: Jaime Mcnair  MRN: [id number]  : 1950   Today's Date: 10/24/2023  Time Calculation  Start Time: 010  Stop Time: 0140  Time Calculation (min): 40 min  Visit #6        Current Problem  1. Weakness of lower extremity, unspecified laterality        2. History of right hip replacement  Follow Up In Physical Therapy            Subjective   Patient noted pain feels like it is on incision and is not too bad. He occasionally will have groin pain.       Precautions  Precautions  STEADI Fall Risk Score (The score of 4 or more indicates an increased risk of falling): no change       Pain  Pain Assessment: 0-10  Pain Score: 1  Pain Type: Acute pain  Pain Location: Hip  Pain Orientation: Right    Objective    Increased step up height from 5\" to 6\"  Increased shuttle resistance      Treatments:  EXERCISES Date:  10-19-23 Date:  10/24/23 Date    Date      VISITS #5 #6      REPS REPS REPS REPS   RKBD STR.  30\" x 3 30\" x 3     Nustep L3 x 10' L3 x10'     Step ups  fwd 5\" 2 x 15 each 6\" 2 x 15 each                     lat 5\" 2 x 15 each 6\" 2 x 15 each            Shuttle  DLP 5B 2 x 15 6B 2 x 15     Shuttle SLP 5B 2 x 15 5B 2 x 15     Shuttle TR/HR  5B 2 x 10            Qhip Flexion 5# 2 x 10 each 5# 2 x 10 each     Qhip Extension Bars 2 x 20 each 5# 2 x 10 each     Qhip Abduction 5# 2 x 10 each 5# 2 x 10 each                                        BALL FLEX DKTC              Side amb 3 laps 3 laps     Back amb 3 laps 3 laps                                   Assessment:  Pt requires minimal cues for posture, pacing and proper technique.     Plan: Continue to progress hip strengthening to improve gait  and functional mobility.      Goals:  Active       Z96.641 (ICD-10-CM) - History of right hip replacement        STG by 2 weeks. 1. Pt. Will c/o less than 1/10 R hip pain with >= 30 min of exercise.  (Progressing)       Start:  10/06/23    Expected End:  10/17/23       "      LTG by discharge: 1. Pt. Will be indep with HeP.       Start:  10/06/23    Expected End:  01/06/24            2. Pt. Will increase R hip strength to wnl to allow wfl mobility.       Start:  10/06/23    Expected End:  01/06/24            3. Pt. Will amb. Indep x 300 feet with increase R LE posture.        Start:  10/06/23    Expected End:  01/06/24                 .

## 2023-10-26 ENCOUNTER — TREATMENT (OUTPATIENT)
Dept: PHYSICAL THERAPY | Facility: HOSPITAL | Age: 73
End: 2023-10-26
Payer: MEDICARE

## 2023-10-26 DIAGNOSIS — Z96.641 HISTORY OF RIGHT HIP REPLACEMENT: ICD-10-CM

## 2023-10-26 DIAGNOSIS — R29.898 WEAKNESS OF LOWER EXTREMITY, UNSPECIFIED LATERALITY: Primary | ICD-10-CM

## 2023-10-26 PROCEDURE — 97110 THERAPEUTIC EXERCISES: CPT | Mod: GP,CQ

## 2023-10-26 ASSESSMENT — PAIN SCALES - GENERAL: PAINLEVEL_OUTOF10: 0 - NO PAIN

## 2023-10-26 ASSESSMENT — PAIN - FUNCTIONAL ASSESSMENT: PAIN_FUNCTIONAL_ASSESSMENT: 0-10

## 2023-10-26 NOTE — PROGRESS NOTES
"Physical Therapy    Physical Therapy Treatment    Patient Name: Jaime Mcnair  MRN: [id number]  : 1950   Today's Date: 10/26/2023  Time Calculation  Start Time: 010  Stop Time: 0140  Time Calculation (min): 40 min  Visit #7          Current Problem  1. Weakness of lower extremity, unspecified laterality        2. History of right hip replacement  Follow Up In Physical Therapy            Subjective   Patient reported pain comes and goes but currently no pain. He noted he sometimes has difficulty sleeping on his surgical side.       Precautions  Precautions  STEADI Fall Risk Score (The score of 4 or more indicates an increased risk of falling): no change       Pain  Pain Assessment: 0-10  Pain Score: 0 - No pain    Objective        Treatments:  EXERCISES Date:  10-19-23 Date:  10/24/23 Date   10/26/23 Date      VISITS #5 #6 #7     REPS REPS REPS REPS   RKBD STR.  30\" x 3 30\" x 3 3 x30 sec    Nustep L3 x 10' L3 x10' L3 10'    Step ups  fwd 5\" 2 x 15 each 6\" 2 x 15 each 6\" 2x15 ea                    lat 5\" 2 x 15 each 6\" 2 x 15 each            Shuttle  DLP 5B 2 x 15 6B 2 x 15 6B 2x15    Shuttle SLP 5B 2 x 15 5B 2 x 15 6B 3x15    Shuttle TR/HR  5B 2 x 10 5B 2x15           Qhip Flexion 5# 2 x 10 each 5# 2 x 10 each 5# 2 x 10 each    Qhip Extension Bars 2 x 20 each 5# 2 x 10 each 5# 2 x 10 each    Qhip Abduction 5# 2 x 10 each 5# 2 x 10 each 5# 2 x 10 each           Marching at bar   2x10                         BALL FLEX DKTC              Side amb 3 laps 3 laps     Back amb 3 laps 3 laps            gait   1 lap                     Assessment:  Pt  needed multiple verbal/tactile cues for forward leaning posture during exercises.     Plan: Continue to progress hip strengthening to improve gait and functional mobility.      Goals:  Active       Z96.641 (ICD-10-CM) - History of right hip replacement        STG by 2 weeks. 1. Pt. Will c/o less than 1/10 R hip pain with >= 30 min of exercise.  (Progressing)       Start:  " 10/06/23    Expected End:  10/17/23            LTG by discharge: 1. Pt. Will be indep with HeP.       Start:  10/06/23    Expected End:  01/06/24            2. Pt. Will increase R hip strength to wnl to allow wfl mobility.       Start:  10/06/23    Expected End:  01/06/24            3. Pt. Will amb. Indep x 300 feet with increase R LE posture.        Start:  10/06/23    Expected End:  01/06/24                 .

## 2023-10-31 ENCOUNTER — TREATMENT (OUTPATIENT)
Dept: PHYSICAL THERAPY | Facility: HOSPITAL | Age: 73
End: 2023-10-31
Payer: MEDICARE

## 2023-10-31 DIAGNOSIS — R29.898 WEAKNESS OF LOWER EXTREMITY, UNSPECIFIED LATERALITY: Primary | ICD-10-CM

## 2023-10-31 DIAGNOSIS — Z96.641 HISTORY OF RIGHT HIP REPLACEMENT: ICD-10-CM

## 2023-10-31 PROCEDURE — 97110 THERAPEUTIC EXERCISES: CPT | Mod: GP,CQ | Performed by: PHYSICAL THERAPY ASSISTANT

## 2023-10-31 ASSESSMENT — PAIN SCALES - GENERAL: PAINLEVEL_OUTOF10: 0 - NO PAIN

## 2023-10-31 ASSESSMENT — PAIN - FUNCTIONAL ASSESSMENT: PAIN_FUNCTIONAL_ASSESSMENT: 0-10

## 2023-10-31 NOTE — PROGRESS NOTES
"Physical Therapy    Physical Therapy Treatment    Patient Name: Jaime Mcnair  MRN: [id number]  : 1950   Today's Date: 10/31/2023  Time Calculation  Start Time: 1257  Stop Time: 1339  Time Calculation (min): 42 min  Visit #8          Current Problem  1. Weakness of lower extremity, unspecified laterality        2. History of right hip replacement  Follow Up In Physical Therapy            Subjective   Patient reports soreness only over incision site. Still has difficulty sleeping on his surgical side. States he can now get up into his truck.       Precautions  Precautions  STEADI Fall Risk Score (The score of 4 or more indicates an increased risk of falling): no change      Pain  Pain Assessment: 0-10  Pain Score: 0 - No pain  Pain Type: Other (Comment) (Soreness at incision site)    Objective        Treatments:  EXERCISES Date:  10-19-23 Date:  10/24/23 Date   10/26/23 Date   10/31/23   VISITS #5 #6 #7 #8    REPS REPS REPS REPS   RKBD STR.  30\" x 3 30\" x 3 3 x30 sec 3 x 30 sec   Nustep L3 x 10' L3 x10' L3 10' L4 x 10 min   Step ups  fwd 5\" 2 x 15 each 6\" 2 x 15 each 6\" 2x15 ea 6\" 2 x 15 each                   lat 5\" 2 x 15 each 6\" 2 x 15 each  6 2 x15 up/over          Shuttle  DLP 5B 2 x 15 6B 2 x 15 6B 2x15 6B 3 x 15   Shuttle SLP 5B 2 x 15 5B 2 x 15 6B 3x15 5B 2 x 15   Shuttle TR/HR  5B 2 x 10 5B 2x15 Deferred by pt due to painful corn/callus on foot          Qhip Flexion 5# 2 x 10 each 5# 2 x 10 each 5# 2 x 10 each 5# 2 x 10 each   Qhip Extension Bars 2 x 20 each 5# 2 x 10 each 5# 2 x 10 each 5# 2 x 10  each   Qhip Abduction 5# 2 x 10 each 5# 2 x 10 each 5# 2 x 10 each 5# 2 x 10 each          Marching at bar   2x10 X 20 light HHA                        BALL FLEX DKTC              Side amb 3 laps 3 laps     Back amb 3 laps 3 laps            gait   1 lap            Stairs    Rec with one HR x 3     Assessment:  Pt able to ascend/descend four stairs with one rail reciprocally x 3 trials.   Pt  needed multiple " verbal/tactile cues for forward leaning posture during exercises.   Recheck next visit.    Plan: Continue to progress hip strengthening to improve gait and functional mobility.      Goals:  Active       Z96.641 (ICD-10-CM) - History of right hip replacement        STG by 2 weeks. 1. Pt. Will c/o less than 1/10 R hip pain with >= 30 min of exercise.  (Progressing)       Start:  10/06/23    Expected End:  10/17/23            LTG by discharge: 1. Pt. Will be indep with HeP.       Start:  10/06/23    Expected End:  01/06/24            2. Pt. Will increase R hip strength to wnl to allow wfl mobility.       Start:  10/06/23    Expected End:  01/06/24            3. Pt. Will amb. Indep x 300 feet with increase R LE posture.        Start:  10/06/23    Expected End:  01/06/24                 .

## 2023-11-02 ENCOUNTER — TREATMENT (OUTPATIENT)
Dept: PHYSICAL THERAPY | Facility: HOSPITAL | Age: 73
End: 2023-11-02
Payer: MEDICARE

## 2023-11-02 DIAGNOSIS — R29.898 WEAKNESS OF LOWER EXTREMITY, UNSPECIFIED LATERALITY: Primary | ICD-10-CM

## 2023-11-02 DIAGNOSIS — Z96.641 HISTORY OF RIGHT HIP REPLACEMENT: ICD-10-CM

## 2023-11-02 PROCEDURE — 97110 THERAPEUTIC EXERCISES: CPT | Mod: GP | Performed by: PHYSICAL THERAPIST

## 2023-11-02 ASSESSMENT — PAIN - FUNCTIONAL ASSESSMENT: PAIN_FUNCTIONAL_ASSESSMENT: 0-10

## 2023-11-02 ASSESSMENT — PAIN SCALES - GENERAL: PAINLEVEL_OUTOF10: 2

## 2023-11-02 NOTE — PROGRESS NOTES
"Physical Therapy    Physical Therapy Treatment - discharge    Patient Name: Jaime Mcnair  MRN: [id number]  : 1950   Today's Date: 2023  Time Calculation  Start Time: 1345  Stop Time: 1420  Time Calculation (min): 35 min  Visit #9          Current Problem  1. Weakness of lower extremity, unspecified laterality        2. History of right hip replacement  Follow Up In Physical Therapy            Subjective   Patient reports soreness only over incision site. Still has difficulty sleeping on his surgical side. States he can now get up into his truck. He is doing stairs well. He will cont. Indep HEP.        Precautions  Precautions  STEADI Fall Risk Score (The score of 4 or more indicates an increased risk of falling): no change      Pain  Pain Assessment: 0-10  Pain Score: 2  Pain Type:  (pain at incision site)    Objective   R hip AROM :   Flex: 110  Abd : 35  Er:30    QUAN LE strength: WNL    cALF Flexibility: +10    Gait: Indep with increased fwd flexion at the trunk.      Treatments:  EXERCISES Date:  10-19-23 Date:  10/24/23 Date   10/26/23 Date   10/31/23 Date: 23   VISITS #5 #6 #7 #8 #9    REPS REPS REPS REPS    RKBD STR.  30\" x 3 30\" x 3 3 x30 sec 3 x 30 sec    Nustep L3 x 10' L3 x10' L3 10' L4 x 10 min L4 10'   Step ups  fwd 5\" 2 x 15 each 6\" 2 x 15 each 6\" 2x15 ea 6\" 2 x 15 each                    lat 5\" 2 x 15 each 6\" 2 x 15 each  6 2 x15 up/over            Shuttle  DLP 5B 2 x 15 6B 2 x 15 6B 2x15 6B 3 x 15    Shuttle SLP 5B 2 x 15 5B 2 x 15 6B 3x15 5B 2 x 15    Shuttle TR/HR  5B 2 x 10 5B 2x15 Deferred by pt due to painful corn/callus on foot            Qhip Flexion 5# 2 x 10 each 5# 2 x 10 each 5# 2 x 10 each 5# 2 x 10 each    Qhip Extension Bars 2 x 20 each 5# 2 x 10 each 5# 2 x 10 each 5# 2 x 10  each    Qhip Abduction 5# 2 x 10 each 5# 2 x 10 each 5# 2 x 10 each 5# 2 x 10 each            Marching at bar   2x10 X 20 light HHA                            BALL FLEX DKTC                Side amb " 3 laps 3 laps      Back amb 3 laps 3 laps              gait   1 lap              Stairs    Rec with one HR x 3      Assessment:  Pt able to ascend/descend four stairs with one rail reciprocally x 3 trials.   Pt  needed multiple verbal/tactile cues for forward leaning posture during exercises.   Recheck next visit.    Plan: Continue to progress hip strengthening to improve gait and functional mobility.      Goals:  Resolved       Z96.641 (ICD-10-CM) - History of right hip replacement        STG by 2 weeks. 1. Pt. Will c/o less than 1/10 R hip pain with >= 30 min of exercise.  (Met)       Start:  10/06/23    Expected End:  10/17/23    Resolved:  11/02/23         LTG by discharge: 1. Pt. Will be indep with HeP. (Met)       Start:  10/06/23    Expected End:  01/06/24    Resolved:  11/02/23         2. Pt. Will increase R hip strength to wnl to allow wfl mobility. (Met)       Start:  10/06/23    Expected End:  01/06/24    Resolved:  11/02/23         3. Pt. Will amb. Indep x 300 feet with increase R LE posture.  (Met)       Start:  10/06/23    Expected End:  01/06/24    Resolved:  11/02/23              .

## 2023-11-06 ENCOUNTER — HOSPITAL ENCOUNTER (OUTPATIENT)
Dept: RADIOLOGY | Facility: HOSPITAL | Age: 73
Discharge: HOME | End: 2023-11-06
Payer: MEDICARE

## 2023-11-06 ENCOUNTER — OFFICE VISIT (OUTPATIENT)
Dept: ORTHOPEDIC SURGERY | Facility: CLINIC | Age: 73
End: 2023-11-06
Payer: MEDICARE

## 2023-11-06 VITALS — BODY MASS INDEX: 28.79 KG/M2 | HEIGHT: 68 IN | WEIGHT: 190 LBS

## 2023-11-06 DIAGNOSIS — M16.11 PRIMARY OSTEOARTHRITIS OF RIGHT HIP: ICD-10-CM

## 2023-11-06 PROCEDURE — 3077F SYST BP >= 140 MM HG: CPT | Performed by: ORTHOPAEDIC SURGERY

## 2023-11-06 PROCEDURE — 3079F DIAST BP 80-89 MM HG: CPT | Performed by: ORTHOPAEDIC SURGERY

## 2023-11-06 PROCEDURE — 99024 POSTOP FOLLOW-UP VISIT: CPT | Performed by: ORTHOPAEDIC SURGERY

## 2023-11-06 PROCEDURE — 73502 X-RAY EXAM HIP UNI 2-3 VIEWS: CPT | Mod: RT,FY

## 2023-11-06 PROCEDURE — 1125F AMNT PAIN NOTED PAIN PRSNT: CPT | Performed by: ORTHOPAEDIC SURGERY

## 2023-11-06 PROCEDURE — 73502 X-RAY EXAM HIP UNI 2-3 VIEWS: CPT | Mod: RIGHT SIDE | Performed by: STUDENT IN AN ORGANIZED HEALTH CARE EDUCATION/TRAINING PROGRAM

## 2023-11-06 PROCEDURE — 1036F TOBACCO NON-USER: CPT | Performed by: ORTHOPAEDIC SURGERY

## 2023-11-06 PROCEDURE — 1159F MED LIST DOCD IN RCRD: CPT | Performed by: ORTHOPAEDIC SURGERY

## 2023-11-06 NOTE — PROGRESS NOTES
ORTHOPEDIC TOTAL JOINT  POST-OPERATIVE FOLLOW UP      ============================  IMPRESSION/PLAN:  ============================  73 y.o. male s/p Right Total Hip Replacement completed on 8/16/2023 .    PLAN:  -patient is doing excellent 3 months out.  X-rays reviewed with him in the office today.  Continue activities as tolerated while using pain as a guide.  We will follow-up with at the 1 year johnnie of surgery with new x-rays.        Jaime Mcnair presents today for follow up of joint replacement above.  Minimal pain at this time.  Ambulates no assistive device.  Done with physical therapy.    Review of Systems:   Constitutional: See HPI for pain assessment, No significant weight loss, recent trauma. Denies fevers/chills  Cardiovascular: No chest pain, shortness of breath  Respiratory: No difficulty breathing, cough  Gastrointestinal: No nausea, vomiting, diarrhea, constipation  Musculoskeletal: Noted in HPI, no arthralgias   Integumentary: No rashes, easy bruising, redness   Neurological: no numbness or tingling in extremities, no gait disturbances     Patient Active Problem List   Diagnosis    Allergic rhinitis    Benign enlargement of prostate    Chronic GERD    Essential hypertension    History of bladder cancer    IFG (impaired fasting glucose)    Polycythemia    Primary osteoarthritis of right hip    Ulcerative colitis (CMS/HCC)    Nocturia    Abdominal pain, acute, right lower quadrant    History of right hip replacement    Leg weakness       =================================  EXAM  =================================  GENERAL: A/Ox3, NAD. Appears healthy, well nourished  CARDIAC: regular rate  LUNGS: Breathing non-labored    MUSCULOSKELETAL:  Laterality: right Hip exam  - Strength: Abduction 5/5, Flexion 5/5  - Palpation: non TTP along surgical site  - Incision: Well-healed  - EHL/PF/DF motor intact  - compartments soft, negative homans  - Gait: using no assistive device    NEUROVASCULAR:  - Neurovascular  Status: sensation intact to light touch distally  - Capillary refill brisk at extremities, Bilateral dorsalis pedis pulse 2+      IMAGING: Multiple views right hip and pelvis reviewed by me which demonstrate stable right hip replacement with no signs of loosening or failure when compared to immediate postoperative x-rays. X-rays were personally reviewed by me.  Radiology reports were reviewed by me as well, if available at the time.        Juan Little DO  Attending Surgeon  Joint Replacement and Adult Reconstructive Surgery  [place], [place]

## 2024-01-30 ENCOUNTER — OFFICE VISIT (OUTPATIENT)
Dept: PRIMARY CARE | Facility: CLINIC | Age: 74
End: 2024-01-30
Payer: MEDICARE

## 2024-01-30 ENCOUNTER — LAB (OUTPATIENT)
Dept: LAB | Facility: LAB | Age: 74
End: 2024-01-30
Payer: MEDICARE

## 2024-01-30 DIAGNOSIS — I10 ESSENTIAL HYPERTENSION: ICD-10-CM

## 2024-01-30 DIAGNOSIS — N40.1 BENIGN PROSTATIC HYPERPLASIA WITH LOWER URINARY TRACT SYMPTOMS, SYMPTOM DETAILS UNSPECIFIED: ICD-10-CM

## 2024-01-30 DIAGNOSIS — Z13.6 SCREENING FOR CARDIOVASCULAR CONDITION: ICD-10-CM

## 2024-01-30 DIAGNOSIS — D75.1 POLYCYTHEMIA: ICD-10-CM

## 2024-01-30 LAB
ALBUMIN SERPL BCP-MCNC: 3.5 G/DL (ref 3.4–5)
ALP SERPL-CCNC: 85 U/L (ref 33–136)
ALT SERPL W P-5'-P-CCNC: 12 U/L (ref 10–52)
ANION GAP SERPL CALC-SCNC: 11 MMOL/L (ref 10–20)
AST SERPL W P-5'-P-CCNC: 14 U/L (ref 9–39)
BASOPHILS # BLD AUTO: 0.06 X10*3/UL (ref 0–0.1)
BASOPHILS NFR BLD AUTO: 0.7 %
BILIRUB SERPL-MCNC: 0.5 MG/DL (ref 0–1.2)
BUN SERPL-MCNC: 23 MG/DL (ref 6–23)
CALCIUM SERPL-MCNC: 8.4 MG/DL (ref 8.6–10.3)
CHLORIDE SERPL-SCNC: 107 MMOL/L (ref 98–107)
CHOLEST SERPL-MCNC: 178 MG/DL (ref 0–199)
CHOLESTEROL/HDL RATIO: 5.4
CO2 SERPL-SCNC: 24 MMOL/L (ref 21–32)
CREAT SERPL-MCNC: 1.19 MG/DL (ref 0.5–1.3)
EGFRCR SERPLBLD CKD-EPI 2021: 64 ML/MIN/1.73M*2
EOSINOPHIL # BLD AUTO: 0.35 X10*3/UL (ref 0–0.4)
EOSINOPHIL NFR BLD AUTO: 4.2 %
ERYTHROCYTE [DISTWIDTH] IN BLOOD BY AUTOMATED COUNT: 16.5 % (ref 11.5–14.5)
GLUCOSE SERPL-MCNC: 99 MG/DL (ref 74–99)
HCT VFR BLD AUTO: 50.1 % (ref 41–52)
HDLC SERPL-MCNC: 33 MG/DL
HGB BLD-MCNC: 16.2 G/DL (ref 13.5–17.5)
IMM GRANULOCYTES # BLD AUTO: 0.03 X10*3/UL (ref 0–0.5)
IMM GRANULOCYTES NFR BLD AUTO: 0.4 % (ref 0–0.9)
LDLC SERPL CALC-MCNC: 110 MG/DL
LYMPHOCYTES # BLD AUTO: 1.86 X10*3/UL (ref 0.8–3)
LYMPHOCYTES NFR BLD AUTO: 22.2 %
MCH RBC QN AUTO: 26.2 PG (ref 26–34)
MCHC RBC AUTO-ENTMCNC: 32.3 G/DL (ref 32–36)
MCV RBC AUTO: 81 FL (ref 80–100)
MONOCYTES # BLD AUTO: 0.83 X10*3/UL (ref 0.05–0.8)
MONOCYTES NFR BLD AUTO: 9.9 %
NEUTROPHILS # BLD AUTO: 5.24 X10*3/UL (ref 1.6–5.5)
NEUTROPHILS NFR BLD AUTO: 62.6 %
NON HDL CHOLESTEROL: 145 MG/DL (ref 0–149)
NRBC BLD-RTO: 0 /100 WBCS (ref 0–0)
PLATELET # BLD AUTO: 207 X10*3/UL (ref 150–450)
POTASSIUM SERPL-SCNC: 4 MMOL/L (ref 3.5–5.3)
PROT SERPL-MCNC: 6.2 G/DL (ref 6.4–8.2)
PSA SERPL-MCNC: 3.95 NG/ML
RBC # BLD AUTO: 6.18 X10*6/UL (ref 4.5–5.9)
SODIUM SERPL-SCNC: 138 MMOL/L (ref 136–145)
TRIGL SERPL-MCNC: 177 MG/DL (ref 0–149)
VLDL: 35 MG/DL (ref 0–40)
WBC # BLD AUTO: 8.4 X10*3/UL (ref 4.4–11.3)

## 2024-01-30 PROCEDURE — 80061 LIPID PANEL: CPT

## 2024-01-30 PROCEDURE — 36415 COLL VENOUS BLD VENIPUNCTURE: CPT

## 2024-01-30 PROCEDURE — 85025 COMPLETE CBC W/AUTO DIFF WBC: CPT

## 2024-01-30 PROCEDURE — 84153 ASSAY OF PSA TOTAL: CPT

## 2024-01-30 PROCEDURE — 80053 COMPREHEN METABOLIC PANEL: CPT

## 2024-01-31 ENCOUNTER — OFFICE VISIT (OUTPATIENT)
Dept: PRIMARY CARE | Facility: CLINIC | Age: 74
End: 2024-01-31
Payer: MEDICARE

## 2024-01-31 VITALS
OXYGEN SATURATION: 100 % | DIASTOLIC BLOOD PRESSURE: 60 MMHG | TEMPERATURE: 95.9 F | SYSTOLIC BLOOD PRESSURE: 122 MMHG | HEART RATE: 96 BPM | WEIGHT: 192.8 LBS | BODY MASS INDEX: 29.32 KG/M2

## 2024-01-31 DIAGNOSIS — K51.90 ULCERATIVE COLITIS WITHOUT COMPLICATIONS, UNSPECIFIED LOCATION (MULTI): ICD-10-CM

## 2024-01-31 DIAGNOSIS — N40.1 BENIGN PROSTATIC HYPERPLASIA WITH LOWER URINARY TRACT SYMPTOMS, SYMPTOM DETAILS UNSPECIFIED: ICD-10-CM

## 2024-01-31 DIAGNOSIS — Z00.00 MEDICARE ANNUAL WELLNESS VISIT, SUBSEQUENT: Primary | ICD-10-CM

## 2024-01-31 DIAGNOSIS — Z96.641 HISTORY OF RIGHT HIP REPLACEMENT: ICD-10-CM

## 2024-01-31 DIAGNOSIS — Z85.51 HISTORY OF BLADDER CANCER: ICD-10-CM

## 2024-01-31 DIAGNOSIS — I10 ESSENTIAL HYPERTENSION: ICD-10-CM

## 2024-01-31 PROCEDURE — 1160F RVW MEDS BY RX/DR IN RCRD: CPT | Performed by: INTERNAL MEDICINE

## 2024-01-31 PROCEDURE — 3078F DIAST BP <80 MM HG: CPT | Performed by: INTERNAL MEDICINE

## 2024-01-31 PROCEDURE — 1170F FXNL STATUS ASSESSED: CPT | Performed by: INTERNAL MEDICINE

## 2024-01-31 PROCEDURE — 1036F TOBACCO NON-USER: CPT | Performed by: INTERNAL MEDICINE

## 2024-01-31 PROCEDURE — G0439 PPPS, SUBSEQ VISIT: HCPCS | Performed by: INTERNAL MEDICINE

## 2024-01-31 PROCEDURE — 99214 OFFICE O/P EST MOD 30 MIN: CPT | Performed by: INTERNAL MEDICINE

## 2024-01-31 PROCEDURE — 1159F MED LIST DOCD IN RCRD: CPT | Performed by: INTERNAL MEDICINE

## 2024-01-31 PROCEDURE — 1125F AMNT PAIN NOTED PAIN PRSNT: CPT | Performed by: INTERNAL MEDICINE

## 2024-01-31 PROCEDURE — 1123F ACP DISCUSS/DSCN MKR DOCD: CPT | Performed by: INTERNAL MEDICINE

## 2024-01-31 PROCEDURE — 1158F ADVNC CARE PLAN TLK DOCD: CPT | Performed by: INTERNAL MEDICINE

## 2024-01-31 PROCEDURE — 3074F SYST BP LT 130 MM HG: CPT | Performed by: INTERNAL MEDICINE

## 2024-01-31 RX ORDER — LISINOPRIL 20 MG/1
20 TABLET ORAL DAILY
Qty: 90 TABLET | Refills: 1 | Status: SHIPPED | OUTPATIENT
Start: 2024-01-31 | End: 2024-01-31 | Stop reason: SDUPTHER

## 2024-01-31 RX ORDER — LISINOPRIL 20 MG/1
20 TABLET ORAL DAILY
Qty: 90 TABLET | Refills: 1 | Status: SHIPPED | OUTPATIENT
Start: 2024-01-31

## 2024-01-31 RX ORDER — MESALAMINE 400 MG/1
800 CAPSULE, DELAYED RELEASE ORAL 2 TIMES DAILY
Qty: 360 CAPSULE | Refills: 1 | Status: SHIPPED | OUTPATIENT
Start: 2024-01-31 | End: 2024-01-31 | Stop reason: SDUPTHER

## 2024-01-31 RX ORDER — CHOLESTYRAMINE 4 G/9G
1 POWDER, FOR SUSPENSION ORAL 2 TIMES DAILY
Qty: 180 PACKET | Refills: 1 | Status: SHIPPED | OUTPATIENT
Start: 2024-01-31 | End: 2024-07-29

## 2024-01-31 RX ORDER — CHOLESTYRAMINE 4 G/9G
1 POWDER, FOR SUSPENSION ORAL 2 TIMES DAILY
Qty: 180 PACKET | Refills: 1 | Status: SHIPPED | OUTPATIENT
Start: 2024-01-31 | End: 2024-01-31 | Stop reason: SDUPTHER

## 2024-01-31 RX ORDER — MESALAMINE 400 MG/1
800 CAPSULE, DELAYED RELEASE ORAL 2 TIMES DAILY
Qty: 360 CAPSULE | Refills: 1 | Status: SHIPPED | OUTPATIENT
Start: 2024-01-31

## 2024-01-31 ASSESSMENT — PATIENT HEALTH QUESTIONNAIRE - PHQ9
2. FEELING DOWN, DEPRESSED OR HOPELESS: NOT AT ALL
SUM OF ALL RESPONSES TO PHQ9 QUESTIONS 1 AND 2: 0
1. LITTLE INTEREST OR PLEASURE IN DOING THINGS: NOT AT ALL

## 2024-01-31 ASSESSMENT — ACTIVITIES OF DAILY LIVING (ADL)
BATHING: INDEPENDENT
MANAGING_FINANCES: TOTAL CARE
GROCERY_SHOPPING: INDEPENDENT
DRESSING: INDEPENDENT
DOING_HOUSEWORK: NEEDS ASSISTANCE
TAKING_MEDICATION: TOTAL CARE

## 2024-01-31 ASSESSMENT — ENCOUNTER SYMPTOMS
LOSS OF SENSATION IN FEET: 0
MUSCULOSKELETAL NEGATIVE: 1
CARDIOVASCULAR NEGATIVE: 1
PSYCHIATRIC NEGATIVE: 1
HEMATOLOGIC/LYMPHATIC NEGATIVE: 1
OCCASIONAL FEELINGS OF UNSTEADINESS: 0
NEUROLOGICAL NEGATIVE: 1
ENDOCRINE NEGATIVE: 1
RESPIRATORY NEGATIVE: 1
CONSTITUTIONAL NEGATIVE: 1
ALLERGIC/IMMUNOLOGIC NEGATIVE: 1
DEPRESSION: 0
GASTROINTESTINAL NEGATIVE: 1
EYES NEGATIVE: 1

## 2024-01-31 NOTE — PROGRESS NOTES
Subjective   Patient ID: Jaime Mcnair is a 73 y.o. male who presents for Follow-up and Medicare Annual Wellness Visit Subsequent.  Patient presents today in follow up re:   HTN, GERD and allergic rhinitis.    Patient presents in follow up regarding hypertension.  Blood pressure has been well controlled on current therapy.  No adverse effects of medication reported.  Patient denies chest pain, palpitations, shortness of breath, orthopnea, fatigue or edema.    Patient reports reflux symptoms have been well controlled with current therapy.  Does have occasional flare of Sx after eating certain food.  No dysphagia, regurgitation or other associated complaints.    Rhinitis Sx well compensated on current therapy.          Review of Systems   Constitutional: Negative.    HENT: Negative.     Eyes: Negative.    Respiratory: Negative.     Cardiovascular: Negative.    Gastrointestinal: Negative.    Endocrine: Negative.    Genitourinary: Negative.    Musculoskeletal: Negative.    Skin: Negative.    Allergic/Immunologic: Negative.    Neurological: Negative.    Hematological: Negative.    Psychiatric/Behavioral: Negative.         Objective     Blood pressure 122/60, pulse 96, temperature 35.5 °C (95.9 °F), temperature source Temporal, weight 87.5 kg (192 lb 12.8 oz), SpO2 100 %.   Physical Exam  Vitals reviewed.   Constitutional:       Appearance: Normal appearance.   Neck:      Vascular: No carotid bruit.   Cardiovascular:      Rate and Rhythm: Normal rate and regular rhythm.      Pulses: Normal pulses.      Heart sounds: Normal heart sounds. No murmur heard.  Pulmonary:      Effort: Pulmonary effort is normal.      Breath sounds: Normal breath sounds. No wheezing or rales.   Abdominal:      General: Abdomen is flat. There is no distension.      Palpations: Abdomen is soft.      Tenderness: There is no abdominal tenderness.   Musculoskeletal:         General: Normal range of motion.      Cervical back: Normal range of motion and  neck supple.   Skin:     General: Skin is warm and dry.   Neurological:      General: No focal deficit present.      Mental Status: He is alert and oriented to person, place, and time.   Psychiatric:         Mood and Affect: Mood normal.         Behavior: Behavior normal.         Assessment/Plan   Problem List Items Addressed This Visit       Benign enlargement of prostate    Essential hypertension    Relevant Medications    lisinopril 20 mg tablet    History of bladder cancer    Ulcerative colitis (CMS/HCC)    Relevant Medications    cholestyramine (Questran) 4 gram packet    mesalamine (Delzicol) 400 mg DR capsule    Other Relevant Orders    Referral to Gastroenterology    History of right hip replacement     Other Visit Diagnoses       Medicare annual wellness visit, subsequent    -  Primary          Medicare annual wellness completed with no new findings or issues identified.  Patient has documented advanced care planning and POA in place.  BP well controlled on current therapy.  Will continue present therapy and follow.  GERD Sx well compensated on current therapy.  Will continue present therapy and follow clinically.  Rhinitis Sx well compensated on current therapy.  Will continue present therapy and follow.  UC Sx well controlled on current therapy.  Will continue.  Patient is advised on surveillance colonoscopy which he has not had in quite some time.  Will refer to Dr. Baez of GI dept for eval.  He continues to follow with Urology re:  bladder and prostate hx and Sx.  Patient underwent successful right hip arthroplasty in August per Dr. Little and is doing well.  He continues to follow with Heme re:  polycythemia.    Follow up in 6 months

## 2024-03-06 NOTE — CONSULTS
Service:   Service: General Internal Medicine     Consult:  Consult requested by (Attending Name): Juan Little   Reason: Hospitalist/Teaching Service or PCP for Medical  Management     History of Present Illness:   HPI:    KRYSTIAN PAK is a 73 year old Male with PMH of HTN, DLD, BPH, right hip  OA, who presents for an elective right ESSIE.  IMS consulted for medical management.  Patient states he is feeling well postop, pain between 0-1/10.  Denies any nausea, vomiting, shortness of breath, chest pain.  No current complaints.  States he is eager  for discharge home tomorrow. Postop vitals have been stable.  12 point ROS reviewed, negative except for as noted above  PMHx: As above  PSHx: Reviewed and not pertinent to presenting problem  FHx: Reviewed and not pertinent to presenting problem  Social history: Remote history of smoking as a teenager, denies EtOH use.    PE:  Constitutional: Well developed, well nourished, in  no acute distress. Laying back in bed comfortably and talkative  Eyes: PERRL, EOMI  Head/Neck: Normocephalic, atraumatic. Neck supple, no thyromegaly, JVD. Trachea midline  Respiratory/Thorax: Normal respiratory effort.  Coarse breath sounds bilaterally  Cardiovascular: RRR, no murmurs, rubs, or gallops noted. Peripheral pulses 2+  Gastrointestinal: Bowel sounds normal. Abdomen soft, nontender, nondistended, with no palpable masses  Musculoskeletal: No gross deformities. No gross muscular weakness with no ROM limitation  Extremities: No lower extremity edema noted bilaterally  Neurological: Alert and oriented x3, CN II - VII grossly intact  Psychological: Appropriate mood and affect  Skin: No rashes or lesions noted    Review Family/Social History and ROS:   Social History:    Smoking Status: never smoker  (1)   Alcohol Use: denies (1)   Drug Use: denies  (1)   Drug 2 Use: denies  (1)            Allergies:  ·  Benicar : Anaphylaxis  ·  omeprazole : Other  ·  penicillin : Rash  ·  bismuth   "subsalicylate: Unknown,  Medical Practices    Objective:   Radiology Results:    Results:    I have reviewed this radiology result:         Impression:    No acute fracture or dislocation. Total right hip arthroplasty with  intact alignment.        Xray Pelvis 1 or 2 View [Aug 16 2023 12:12PM]      Impression:    Staging hardware for right hip endoprosthesis placement     No fracture        MACRO:  None     Xray Pelvis 1 or 2 View [Aug 16 2023 11:42AM]        Consult Status:  Consult Order ID: [id number]     Problem/Assessment/Plan:    Impression 1: Right hip OA   Plan for Impression 1: POD#0 right hip ESSIE.  As needed  pain control.  Incentive spirometry.  Management per primary.  Continue to follow   Impression 2: HTN/DLD   Plan for Impression 2: Continue home medications   Impression 3: BPH   Plan for Impression 3: Continue home medications   Impression 4: GERD   Plan for Impression 4: Continue home medications   Impression 5: Obesity   Plan for Impression 5: Evidenced by BMI 31.0   Impression 6: DVT ppx   Plan for Impression 6: SCDs per primary       Electronic Signatures:  Robbie Alcocer)   (Signed 16-Aug-2023 20:56)   Co-Signer: Service, History of Present Illness, Review Family/Social History and ROS, Allergies, Objective, Assessment/Recommendations, Note Completion  Osvaldo Hooker (PAC)   (Signed 16-Aug-2023 18:47)   Authored: Service, History of Present Illness, Review Family/Social History and ROS, Allergies, Objective, Assessment/Recommendations, Note Completion    Last Updated: 16-Aug-2023 20:56 by Robbie Alcocer)    References:  1.  Data Referenced From \"Patient Profile - Adult v2\" 16-Aug-2023 13:58  "

## 2024-03-11 PROBLEM — R10.31 RIGHT LOWER QUADRANT ABDOMINAL PAIN: Status: ACTIVE | Noted: 2023-05-12

## 2024-03-11 PROBLEM — M54.9 BACK PAIN: Status: ACTIVE | Noted: 2023-04-07

## 2024-03-11 PROBLEM — E78.5 HYPERLIPIDEMIA: Status: ACTIVE | Noted: 2023-08-17

## 2024-03-11 PROBLEM — E66.9 OBESITY: Status: ACTIVE | Noted: 2023-08-17

## 2024-03-12 ENCOUNTER — OFFICE VISIT (OUTPATIENT)
Dept: GASTROENTEROLOGY | Facility: CLINIC | Age: 74
End: 2024-03-12
Payer: MEDICARE

## 2024-03-12 VITALS
HEART RATE: 100 BPM | OXYGEN SATURATION: 94 % | SYSTOLIC BLOOD PRESSURE: 133 MMHG | HEIGHT: 68 IN | WEIGHT: 196 LBS | DIASTOLIC BLOOD PRESSURE: 84 MMHG | BODY MASS INDEX: 29.7 KG/M2

## 2024-03-12 DIAGNOSIS — K51.90 ULCERATIVE COLITIS WITHOUT COMPLICATIONS, UNSPECIFIED LOCATION (MULTI): Primary | ICD-10-CM

## 2024-03-12 DIAGNOSIS — Z12.11 SCREENING FOR COLON CANCER: ICD-10-CM

## 2024-03-12 PROCEDURE — 1125F AMNT PAIN NOTED PAIN PRSNT: CPT | Performed by: INTERNAL MEDICINE

## 2024-03-12 PROCEDURE — 3075F SYST BP GE 130 - 139MM HG: CPT | Performed by: INTERNAL MEDICINE

## 2024-03-12 PROCEDURE — 3079F DIAST BP 80-89 MM HG: CPT | Performed by: INTERNAL MEDICINE

## 2024-03-12 PROCEDURE — 99204 OFFICE O/P NEW MOD 45 MIN: CPT | Performed by: INTERNAL MEDICINE

## 2024-03-12 PROCEDURE — 1159F MED LIST DOCD IN RCRD: CPT | Performed by: INTERNAL MEDICINE

## 2024-03-12 PROCEDURE — 1160F RVW MEDS BY RX/DR IN RCRD: CPT | Performed by: INTERNAL MEDICINE

## 2024-03-12 PROCEDURE — 1036F TOBACCO NON-USER: CPT | Performed by: INTERNAL MEDICINE

## 2024-03-12 PROCEDURE — 1123F ACP DISCUSS/DSCN MKR DOCD: CPT | Performed by: INTERNAL MEDICINE

## 2024-03-12 ASSESSMENT — ENCOUNTER SYMPTOMS
ROS GI COMMENTS: AS DETAILED ABOVE.
HEADACHES: 0
SORE THROAT: 0
WHEEZING: 0
ARTHRALGIAS: 1
DYSURIA: 0
NERVOUS/ANXIOUS: 0
FEVER: 0
NUMBNESS: 0
SEIZURES: 0
CONFUSION: 0
TROUBLE SWALLOWING: 0
PALPITATIONS: 0
BRUISES/BLEEDS EASILY: 0
HEMATURIA: 0
FATIGUE: 0
COUGH: 0
WEAKNESS: 0
MYALGIAS: 0
CHILLS: 0
SHORTNESS OF BREATH: 0
BACK PAIN: 1
VOICE CHANGE: 0
DIZZINESS: 0
UNEXPECTED WEIGHT CHANGE: 0
ADENOPATHY: 0

## 2024-03-12 NOTE — PROGRESS NOTES
[facility] Gastroenterology    ASSESSMENT and PLAN:       Jaime Mcnair is a 73 y.o. male with a significant past medical history of bladder cancer, HTN, HLD, BPH, OA, and GERD who presents for consultation requested by his primary care provider (Josafat Barillas MD) to establish care for UC.       UC  Very unclear remote diagnosis. Unfortunately no records or details regarding this diagnosis are available. His reported history is a little atypical for UC and I wonder if the episode that led to this diagnosis was instead some other form of self limited colitis rather than UC. He reports that his last colonoscopy was normal and that he has been asymptomatic since the time of diagnosis. He has been on 5-ASA without difficulty. Will check labs and colonoscopy. If those are normal could consider a trial off of medication.  - labs ordered  - colonoscopy scheduled        Follow up with GI as needed.          Roman Baez MD        Gastroenterology    [facility]    Clinical   [facility]        Subjective   HISTORY OF PRESENT ILLNESS:     Chief Complaint  New Patient Visit (To establish)    History Of Present Illness:    Jaime Mcnair is a 73 y.o. male with a significant past medical history of bladder cancer, HTN, HLD, BPH, OA, and GERD who presents for consultation requested by his primary care provider (Josafat Barillas MD) to establish care for UC.     His medication list includes cholestyramine, meloxicam, pantoprazole, senna, and mesalamine.    He was recently seen by his PCP who reported that he had a history of UC that was well controlled on Mesalamine and Cholestyramine. He did recommend surveillance colonoscopy and he was subsequently referred to GI.    There are no previous GI notes in the EMR and no endoscopy reports.    Labs have shown an unremarkable CBC, unremarkable CMP, and normal iron/ferritin. He previously  "had a CT in May 2023 that showed a normal colon. A previous CT in April 2023 showed mild transverse colon wall thickening, but was otherwise normal.      Today he says that he was diagnosed with \"colitis\" in 2011, but he can't recall the details that led to that diagnosis. He thinks that he had a colonoscopy in 2014 that was normal. He says that he was started on Mesalamine in 2011 and he has been taking it since that time. He says that he is using Cholestyramine since he had his gallbladder removed. He does not think that he was ever on any other medications for UC and he says that he really hasn't had any issues with it since the time of his diagnosis. He denies any previous GI surgeries. Intermittently he has had some heartburn, but feels like pantoprazole controls that well.    Recently he says that he has not had any GI symptoms. He specifically denies any abdominal pain, diarrhea, or rectal bleeding.      Patient denies any heartburn/GERD, N/V, dysphagia, odynophagia, abdominal pain, diarrhea, constipation, hematemesis, hematochezia, melena, or weight loss.      Review of systems:   Review of Systems   Constitutional:  Negative for chills, fatigue, fever and unexpected weight change.   HENT:  Negative for congestion, sneezing, sore throat, trouble swallowing and voice change.    Respiratory:  Negative for cough, shortness of breath and wheezing.    Cardiovascular:  Negative for chest pain, palpitations and leg swelling.   Gastrointestinal:         As detailed above.   Genitourinary:  Negative for dysuria and hematuria.   Musculoskeletal:  Positive for arthralgias and back pain. Negative for myalgias.   Skin:  Negative for pallor and rash.   Neurological:  Negative for dizziness, seizures, syncope, weakness, numbness and headaches.   Hematological:  Negative for adenopathy. Does not bruise/bleed easily.   Psychiatric/Behavioral:  Negative for confusion. The patient is not nervous/anxious.    All other systems " "reviewed and are negative.      I performed a complete 10 point review of systems and it is negative except as noted in HPI or above.      PAST HISTORIES:       Past Medical History:  He has a past medical history of GERD (gastroesophageal reflux disease) and Hypertension.    Past Surgical History:  He has a past surgical history that includes Other surgical history (08/26/2019); Other surgical history (08/26/2019); Other surgical history (08/26/2019); IR injection joint (07/28/2020); IR injection joint (03/25/2021); IR injection joint (08/12/2021); IR injection joint (02/11/2022); IR injection joint (09/08/2022); IR injection joint (03/09/2023); and Bladder tumor excision.      Social History:  He reports that he has never smoked. He has never used smokeless tobacco. He reports that he does not currently use alcohol. He reports that he does not use drugs.    Family History:  No known GI disease, specifically denies pancreatitis, Crohn's, colon cancer, gastroesophageal cancer, or ulcerative colitis.    Family History   Problem Relation Name Age of Onset   • Throat cancer Mother     • Hypertension Father     • Other (bladder cancer) Father     • Breast cancer Neg Hx          Allergies:  Benicar [olmesartan], Omeprazole, Pepto-bismol [bismuth subsalicylate], and Penicillins      Objective   OBJECTIVE:       Last Recorded Vitals:  Vitals:    03/12/24 1447   BP: 133/84   Pulse: 100   SpO2: 94%   Weight: 88.9 kg (196 lb)   Height: 1.727 m (5' 8\")     /84   Pulse 100   Ht 1.727 m (5' 8\")   Wt 88.9 kg (196 lb)   SpO2 94%   BMI 29.80 kg/m²      Physical Exam:    Physical Exam  Vitals reviewed.   Constitutional:       General: He is not in acute distress.     Appearance: He is obese. He is not ill-appearing.   HENT:      Head: Normocephalic and atraumatic.   Eyes:      General: No scleral icterus.  Cardiovascular:      Rate and Rhythm: Normal rate and regular rhythm.      Pulses: Normal pulses.      Heart sounds: " Normal heart sounds. No murmur heard.  Pulmonary:      Effort: Pulmonary effort is normal. No respiratory distress.      Breath sounds: Normal breath sounds. No wheezing.   Abdominal:      General: Bowel sounds are normal.      Palpations: Abdomen is soft.      Tenderness: There is no abdominal tenderness. There is no rebound.   Musculoskeletal:         General: No swelling or deformity.   Skin:     General: Skin is warm and dry.      Coloration: Skin is not jaundiced.      Findings: No rash.   Neurological:      General: No focal deficit present.      Mental Status: He is alert and oriented to person, place, and time.   Psychiatric:         Mood and Affect: Mood normal.         Behavior: Behavior normal.         Thought Content: Thought content normal.         Judgment: Judgment normal.         Home Medications:  Prior to Admission medications    Medication Sig Start Date End Date Taking? Authorizing Provider   acetaminophen (Tylenol) 500 mg tablet TAKE 2 TABLETS BY MOUTH EVERY 6-8 HOURS FOR PAIN 8/15/23 8/14/24  Juan Little,    cetirizine (ZyrTEC) 10 mg tablet Take by mouth once daily.    Historical Provider, MD   cholecalciferol (Vitamin D-3) 125 MCG (5000 UT) capsule Take by mouth once daily.    Historical Provider, MD   cholestyramine (Questran) 4 gram packet Take 1 packet (4 g) by mouth 2 times a day. 1/31/24 7/29/24  Josafat Barillas MD   cyclobenzaprine (Flexeril) 5 mg tablet  8/15/23   Historical Provider, MD   docosahexaenoic acid/epa (FISH OIL ORAL) Take by mouth once daily.    Historical Provider, MD   fluticasone (Flonase) 50 mcg/actuation nasal spray Administer 2 sprays into affected nostril(s) once daily. 8/8/19   Historical Provider, MD NEIL Castrejon. bifidum-B longum (Probiotic Colon Care) 1.5 billion cell capsule Take by mouth once daily.    Historical Provider, MD   lisinopril 20 mg tablet Take 1 tablet (20 mg) by mouth once daily. 1/31/24   Josafat Barillas MD   meloxicam (Mobic) 15 mg tablet   "5/3/17   Historical Provider, MD   mesalamine (Delzicol) 400 mg DR capsule Take 2 capsules (800 mg) by mouth 2 times a day. 1/31/24   Josafat Barillas MD   pantoprazole (ProtoNix) 40 mg EC tablet  8/15/23   Historical Provider, MD   pantoprazole (ProtoNix) 40 mg EC tablet TAKE 1 TABLET BY MOUTH ONCE DAILY  Patient not taking: Reported on 11/6/2023 8/15/23 8/14/24  Juan Little DO   sennosides (Senokot) 8.6 mg tablet TAKE 1 TABLET BY MOUTH TWO TIMES A DAY AS NEEDED TO PREVENT CONSTIPATION  Patient not taking: Reported on 11/6/2023 8/15/23 8/14/24  Juan Little DO   tamsulosin (Flomax) 0.4 mg 24 hr capsule Take by mouth. 10/20/17   Sky Orellana MD         Relevant Results Recent labs reviewed in the EMR.    Lab Results   Component Value Date/Time    WBC 8.4 01/30/2024 1359    HGB 16.2 01/30/2024 1359    HGB 15.0 08/17/2023 0424    HGB 17.9 (H) 08/01/2023 1343    HGB 17.2 06/20/2023 0905    MCV 81 01/30/2024 1359     01/30/2024 1359     08/17/2023 0424     08/01/2023 1343     06/20/2023 0905    IRON 70 06/20/2023 0903    TIBC 288 06/20/2023 0903    IRONSAT 24 (L) 06/20/2023 0903    FERRITIN 282 06/20/2023 0903       Lab Results   Component Value Date/Time     01/30/2024 1359    K 4.0 01/30/2024 1359     01/30/2024 1359    BUN 23 01/30/2024 1359    CREATININE 1.19 01/30/2024 1359    CREATININE 1.39 (H) 08/17/2023 0424    CREATININE 1.21 08/01/2023 1343       Lab Results   Component Value Date/Time    BILITOT 0.5 01/30/2024 1359    BILITOT 0.4 08/17/2023 0424    BILITOT 0.5 06/20/2023 0903    BILITOT 0.5 01/24/2023 0943    ALKPHOS 85 01/30/2024 1359    ALKPHOS 73 08/17/2023 0424    ALKPHOS 89 06/20/2023 0903    ALKPHOS 105 01/24/2023 0943    AST 14 01/30/2024 1359    AST 26 08/17/2023 0424    AST 14 06/20/2023 0903    AST 14 01/24/2023 0943    ALT 12 01/30/2024 1359    ALT 16 08/17/2023 0424    ALT 19 06/20/2023 0903    ALT 17 01/24/2023 0943       No results found for: \"CRP\", " "\"CALPS\"    Radiology: Imaging reviewed in the EMR.  No results found.    === 05/12/23 ===    CT PELVIS W IV CONTRAST    - Impression -  No CT evidence of acute process within the pelvis. Incidental  findings as above.      "

## 2024-03-12 NOTE — PATIENT INSTRUCTIONS
Check labs.        You have been scheduled for a colonoscopy.  You were given instructions for preparing for this test in the office today.  If you have questions about these instructions, please call my office at [phone].    After your procedure, you can expect me to talk to you to go over the results of the procedure. If polyps were removed I will usually be able to tell you my initial thoughts about those polyps and give you some idea of when you should have another colonoscopy.    If any polyps are removed during your procedure or if any biopsies are obtained those specimens will go to the pathologists to review under the microscope. Once those results are available they will be sent to me electronically to review. These results will also be available to you at that time through the patient portal. I briefly review all of these results by the next business day to determine if there is any urgent information that needs to be communicated to you right away or anything that would significantly change what I told you at the time of the procedure. If there is nothing urgent this is usually a good sign and I will then do a more extensive review of the result and send you a letter with my final recommendations within a week of the result becoming available. If you have questions or need additional information I urge you to call the office at [phone], but I do ask for patience as I spend a good portion of each day performing procedures like the one you are scheduled for and during those times I am not able to take or return routine phone calls.    You were also given information regarding the schedule for your procedure including the time that you need to arrive to the endoscopy unit.  You will also be contacted 2-3 day prior to your procedure to confirm the final arrival time.  If you have questions about this or if you need to cancel or change this appointment please call my office at [phone].        Follow up with GI as needed.

## 2024-03-12 NOTE — LETTER
March 12, 2024     Josafat Barillas MD  [address]  [facility], [address] [facility] [address]    Patient: Jaime Mcnair   YOB: 1950   Date of Visit: 3/12/2024       Dear Dr. Josafat Barillas MD:    Thank you for referring Jaime Mcnair to me for evaluation. Below are my notes for this consultation.  If you have questions, please do not hesitate to call me. I look forward to following your patient along with you.       Sincerely,     Roman Baez MD      CC: No Recipients  ______________________________________________________________________________________        [facility]    ASSESSMENT and PLAN:       Jaime Mcnair is a 73 y.o. male with a significant past medical history of bladder cancer, HTN, HLD, BPH, OA, and GERD who presents for consultation requested by his primary care provider (Josafat Barillas MD) to establish care for UC.       UC  Very unclear remote diagnosis. Unfortunately no records or details regarding this diagnosis are available. His reported history is a little atypical for UC and I wonder if the episode that led to this diagnosis was instead some other form of self limited colitis rather than UC. He reports that his last colonoscopy was normal and that he has been asymptomatic since the time of diagnosis. He has been on 5-ASA without difficulty. Will check labs and colonoscopy. If those are normal could consider a trial off of medication.  - labs ordered  - colonoscopy scheduled        Follow up with GI as needed.          Roman Baez MD        Gastroenterology    [facility]    Clinical   [facility]        Subjective  HISTORY OF PRESENT ILLNESS:     Chief Complaint  New Patient Visit (To establish)    History Of Present Illness:    Jaime Mcnair is a 73 y.o. male with a significant past medical history of bladder cancer, HTN, HLD, BPH, OA, and  "GERD who presents for consultation requested by his primary care provider (Josafat Barillas MD) to establish care for UC.     His medication list includes cholestyramine, meloxicam, pantoprazole, senna, and mesalamine.    He was recently seen by his PCP who reported that he had a history of UC that was well controlled on Mesalamine and Cholestyramine. He did recommend surveillance colonoscopy and he was subsequently referred to GI.    There are no previous GI notes in the EMR and no endoscopy reports.    Labs have shown an unremarkable CBC, unremarkable CMP, and normal iron/ferritin. He previously had a CT in May 2023 that showed a normal colon. A previous CT in April 2023 showed mild transverse colon wall thickening, but was otherwise normal.      Today he says that he was diagnosed with \"colitis\" in 2011, but he can't recall the details that led to that diagnosis. He thinks that he had a colonoscopy in 2014 that was normal. He says that he was started on Mesalamine in 2011 and he has been taking it since that time. He says that he is using Cholestyramine since he had his gallbladder removed. He does not think that he was ever on any other medications for UC and he says that he really hasn't had any issues with it since the time of his diagnosis. He denies any previous GI surgeries. Intermittently he has had some heartburn, but feels like pantoprazole controls that well.    Recently he says that he has not had any GI symptoms. He specifically denies any abdominal pain, diarrhea, or rectal bleeding.      Patient denies any heartburn/GERD, N/V, dysphagia, odynophagia, abdominal pain, diarrhea, constipation, hematemesis, hematochezia, melena, or weight loss.      Review of systems:   Review of Systems   Constitutional:  Negative for chills, fatigue, fever and unexpected weight change.   HENT:  Negative for congestion, sneezing, sore throat, trouble swallowing and voice change.    Respiratory:  Negative for cough, " shortness of breath and wheezing.    Cardiovascular:  Negative for chest pain, palpitations and leg swelling.   Gastrointestinal:         As detailed above.   Genitourinary:  Negative for dysuria and hematuria.   Musculoskeletal:  Positive for arthralgias and back pain. Negative for myalgias.   Skin:  Negative for pallor and rash.   Neurological:  Negative for dizziness, seizures, syncope, weakness, numbness and headaches.   Hematological:  Negative for adenopathy. Does not bruise/bleed easily.   Psychiatric/Behavioral:  Negative for confusion. The patient is not nervous/anxious.    All other systems reviewed and are negative.      I performed a complete 10 point review of systems and it is negative except as noted in HPI or above.      PAST HISTORIES:       Past Medical History:  He has a past medical history of GERD (gastroesophageal reflux disease) and Hypertension.    Past Surgical History:  He has a past surgical history that includes Other surgical history (08/26/2019); Other surgical history (08/26/2019); Other surgical history (08/26/2019); IR injection joint (07/28/2020); IR injection joint (03/25/2021); IR injection joint (08/12/2021); IR injection joint (02/11/2022); IR injection joint (09/08/2022); IR injection joint (03/09/2023); and Bladder tumor excision.      Social History:  He reports that he has never smoked. He has never used smokeless tobacco. He reports that he does not currently use alcohol. He reports that he does not use drugs.    Family History:  No known GI disease, specifically denies pancreatitis, Crohn's, colon cancer, gastroesophageal cancer, or ulcerative colitis.    Family History   Problem Relation Name Age of Onset   • Throat cancer Mother     • Hypertension Father     • Other (bladder cancer) Father     • Breast cancer Neg Hx          Allergies:  Benicar [olmesartan], Omeprazole, Pepto-bismol [bismuth subsalicylate], and Penicillins      Objective  OBJECTIVE:       Last Recorded  "Vitals:  Vitals:    03/12/24 1447   BP: 133/84   Pulse: 100   SpO2: 94%   Weight: 88.9 kg (196 lb)   Height: 1.727 m (5' 8\")     /84   Pulse 100   Ht 1.727 m (5' 8\")   Wt 88.9 kg (196 lb)   SpO2 94%   BMI 29.80 kg/m²      Physical Exam:    Physical Exam  Vitals reviewed.   Constitutional:       General: He is not in acute distress.     Appearance: He is obese. He is not ill-appearing.   HENT:      Head: Normocephalic and atraumatic.   Eyes:      General: No scleral icterus.  Cardiovascular:      Rate and Rhythm: Normal rate and regular rhythm.      Pulses: Normal pulses.      Heart sounds: Normal heart sounds. No murmur heard.  Pulmonary:      Effort: Pulmonary effort is normal. No respiratory distress.      Breath sounds: Normal breath sounds. No wheezing.   Abdominal:      General: Bowel sounds are normal.      Palpations: Abdomen is soft.      Tenderness: There is no abdominal tenderness. There is no rebound.   Musculoskeletal:         General: No swelling or deformity.   Skin:     General: Skin is warm and dry.      Coloration: Skin is not jaundiced.      Findings: No rash.   Neurological:      General: No focal deficit present.      Mental Status: He is alert and oriented to person, place, and time.   Psychiatric:         Mood and Affect: Mood normal.         Behavior: Behavior normal.         Thought Content: Thought content normal.         Judgment: Judgment normal.         Home Medications:  Prior to Admission medications    Medication Sig Start Date End Date Taking? Authorizing Provider   acetaminophen (Tylenol) 500 mg tablet TAKE 2 TABLETS BY MOUTH EVERY 6-8 HOURS FOR PAIN 8/15/23 8/14/24  Juan Little DO   cetirizine (ZyrTEC) 10 mg tablet Take by mouth once daily.    Historical Provider, MD   cholecalciferol (Vitamin D-3) 125 MCG (5000 UT) capsule Take by mouth once daily.    Historical Provider, MD   cholestyramine (Questran) 4 gram packet Take 1 packet (4 g) by mouth 2 times a day. 1/31/24 " 7/29/24  Josafat Barillas MD   cyclobenzaprine (Flexeril) 5 mg tablet  8/15/23   Historical Provider, MD   docosahexaenoic acid/epa (FISH OIL ORAL) Take by mouth once daily.    Historical Provider, MD   fluticasone (Flonase) 50 mcg/actuation nasal spray Administer 2 sprays into affected nostril(s) once daily. 8/8/19   Historical Provider, MD   L. gasseri-B. bifidum-B longum (Probiotic Colon Care) 1.5 billion cell capsule Take by mouth once daily.    Historical Provider, MD   lisinopril 20 mg tablet Take 1 tablet (20 mg) by mouth once daily. 1/31/24   Josfaat Barillas MD   meloxicam (Mobic) 15 mg tablet  5/3/17   Historical Provider, MD   mesalamine (Delzicol) 400 mg DR capsule Take 2 capsules (800 mg) by mouth 2 times a day. 1/31/24   Josafat Barillas MD   pantoprazole (ProtoNix) 40 mg EC tablet  8/15/23   Historical Provider, MD   pantoprazole (ProtoNix) 40 mg EC tablet TAKE 1 TABLET BY MOUTH ONCE DAILY  Patient not taking: Reported on 11/6/2023 8/15/23 8/14/24  Juan Little DO   sennosides (Senokot) 8.6 mg tablet TAKE 1 TABLET BY MOUTH TWO TIMES A DAY AS NEEDED TO PREVENT CONSTIPATION  Patient not taking: Reported on 11/6/2023 8/15/23 8/14/24  Juan Little DO   tamsulosin (Flomax) 0.4 mg 24 hr capsule Take by mouth. 10/20/17   Sky Orellana MD         Relevant Results Recent labs reviewed in the EMR.    Lab Results   Component Value Date/Time    WBC 8.4 01/30/2024 1359    HGB 16.2 01/30/2024 1359    HGB 15.0 08/17/2023 0424    HGB 17.9 (H) 08/01/2023 1343    HGB 17.2 06/20/2023 0905    MCV 81 01/30/2024 1359     01/30/2024 1359     08/17/2023 0424     08/01/2023 1343     06/20/2023 0905    IRON 70 06/20/2023 0903    TIBC 288 06/20/2023 0903    IRONSAT 24 (L) 06/20/2023 0903    FERRITIN 282 06/20/2023 0903       Lab Results   Component Value Date/Time     01/30/2024 1359    K 4.0 01/30/2024 1359     01/30/2024 1359    BUN 23 01/30/2024 1359    CREATININE 1.19 01/30/2024 1359  "   CREATININE 1.39 (H) 08/17/2023 0424    CREATININE 1.21 08/01/2023 1343       Lab Results   Component Value Date/Time    BILITOT 0.5 01/30/2024 1359    BILITOT 0.4 08/17/2023 0424    BILITOT 0.5 06/20/2023 0903    BILITOT 0.5 01/24/2023 0943    ALKPHOS 85 01/30/2024 1359    ALKPHOS 73 08/17/2023 0424    ALKPHOS 89 06/20/2023 0903    ALKPHOS 105 01/24/2023 0943    AST 14 01/30/2024 1359    AST 26 08/17/2023 0424    AST 14 06/20/2023 0903    AST 14 01/24/2023 0943    ALT 12 01/30/2024 1359    ALT 16 08/17/2023 0424    ALT 19 06/20/2023 0903    ALT 17 01/24/2023 0943       No results found for: \"CRP\", \"CALPS\"    Radiology: Imaging reviewed in the EMR.  No results found.    === 05/12/23 ===    CT PELVIS W IV CONTRAST    - Impression -  No CT evidence of acute process within the pelvis. Incidental  findings as above.      "

## 2024-03-12 NOTE — H&P (VIEW-ONLY)
[facility]    ASSESSMENT and PLAN:       Jaime Mcnair is a 73 y.o. male with a significant past medical history of bladder cancer, HTN, HLD, BPH, OA, and GERD who presents for consultation requested by his primary care provider (Josafat Barillas MD) to establish care for UC.       UC  Very unclear remote diagnosis. Unfortunately no records or details regarding this diagnosis are available. His reported history is a little atypical for UC and I wonder if the episode that led to this diagnosis was instead some other form of self limited colitis rather than UC. He reports that his last colonoscopy was normal and that he has been asymptomatic since the time of diagnosis. He has been on 5-ASA without difficulty. Will check labs and colonoscopy. If those are normal could consider a trial off of medication.  - labs ordered  - colonoscopy scheduled        Follow up with GI as needed.          Roman Baez MD        Gastroenterology    [facility]    Clinical   [facility]        Subjective   HISTORY OF PRESENT ILLNESS:     Chief Complaint  New Patient Visit (To establish)    History Of Present Illness:    Jaime Mcnair is a 73 y.o. male with a significant past medical history of bladder cancer, HTN, HLD, BPH, OA, and GERD who presents for consultation requested by his primary care provider (Josafat Barillas MD) to establish care for UC.     His medication list includes cholestyramine, meloxicam, pantoprazole, senna, and mesalamine.    He was recently seen by his PCP who reported that he had a history of UC that was well controlled on Mesalamine and Cholestyramine. He did recommend surveillance colonoscopy and he was subsequently referred to GI.    There are no previous GI notes in the EMR and no endoscopy reports.    Labs have shown an unremarkable CBC, unremarkable CMP, and normal iron/ferritin. He previously  "had a CT in May 2023 that showed a normal colon. A previous CT in April 2023 showed mild transverse colon wall thickening, but was otherwise normal.      Today he says that he was diagnosed with \"colitis\" in 2011, but he can't recall the details that led to that diagnosis. He thinks that he had a colonoscopy in 2014 that was normal. He says that he was started on Mesalamine in 2011 and he has been taking it since that time. He says that he is using Cholestyramine since he had his gallbladder removed. He does not think that he was ever on any other medications for UC and he says that he really hasn't had any issues with it since the time of his diagnosis. He denies any previous GI surgeries. Intermittently he has had some heartburn, but feels like pantoprazole controls that well.    Recently he says that he has not had any GI symptoms. He specifically denies any abdominal pain, diarrhea, or rectal bleeding.      Patient denies any heartburn/GERD, N/V, dysphagia, odynophagia, abdominal pain, diarrhea, constipation, hematemesis, hematochezia, melena, or weight loss.      Review of systems:   Review of Systems   Constitutional:  Negative for chills, fatigue, fever and unexpected weight change.   HENT:  Negative for congestion, sneezing, sore throat, trouble swallowing and voice change.    Respiratory:  Negative for cough, shortness of breath and wheezing.    Cardiovascular:  Negative for chest pain, palpitations and leg swelling.   Gastrointestinal:         As detailed above.   Genitourinary:  Negative for dysuria and hematuria.   Musculoskeletal:  Positive for arthralgias and back pain. Negative for myalgias.   Skin:  Negative for pallor and rash.   Neurological:  Negative for dizziness, seizures, syncope, weakness, numbness and headaches.   Hematological:  Negative for adenopathy. Does not bruise/bleed easily.   Psychiatric/Behavioral:  Negative for confusion. The patient is not nervous/anxious.    All other systems " "reviewed and are negative.      I performed a complete 10 point review of systems and it is negative except as noted in HPI or above.      PAST HISTORIES:       Past Medical History:  He has a past medical history of GERD (gastroesophageal reflux disease) and Hypertension.    Past Surgical History:  He has a past surgical history that includes Other surgical history (08/26/2019); Other surgical history (08/26/2019); Other surgical history (08/26/2019); IR injection joint (07/28/2020); IR injection joint (03/25/2021); IR injection joint (08/12/2021); IR injection joint (02/11/2022); IR injection joint (09/08/2022); IR injection joint (03/09/2023); and Bladder tumor excision.      Social History:  He reports that he has never smoked. He has never used smokeless tobacco. He reports that he does not currently use alcohol. He reports that he does not use drugs.    Family History:  No known GI disease, specifically denies pancreatitis, Crohn's, colon cancer, gastroesophageal cancer, or ulcerative colitis.    Family History   Problem Relation Name Age of Onset    Throat cancer Mother      Hypertension Father      Other (bladder cancer) Father      Breast cancer Neg Hx          Allergies:  Benicar [olmesartan], Omeprazole, Pepto-bismol [bismuth subsalicylate], and Penicillins      Objective   OBJECTIVE:       Last Recorded Vitals:  Vitals:    03/12/24 1447   BP: 133/84   Pulse: 100   SpO2: 94%   Weight: 88.9 kg (196 lb)   Height: 1.727 m (5' 8\")     /84   Pulse 100   Ht 1.727 m (5' 8\")   Wt 88.9 kg (196 lb)   SpO2 94%   BMI 29.80 kg/m²      Physical Exam:    Physical Exam  Vitals reviewed.   Constitutional:       General: He is not in acute distress.     Appearance: He is obese. He is not ill-appearing.   HENT:      Head: Normocephalic and atraumatic.   Eyes:      General: No scleral icterus.  Cardiovascular:      Rate and Rhythm: Normal rate and regular rhythm.      Pulses: Normal pulses.      Heart sounds: " Normal heart sounds. No murmur heard.  Pulmonary:      Effort: Pulmonary effort is normal. No respiratory distress.      Breath sounds: Normal breath sounds. No wheezing.   Abdominal:      General: Bowel sounds are normal.      Palpations: Abdomen is soft.      Tenderness: There is no abdominal tenderness. There is no rebound.   Musculoskeletal:         General: No swelling or deformity.   Skin:     General: Skin is warm and dry.      Coloration: Skin is not jaundiced.      Findings: No rash.   Neurological:      General: No focal deficit present.      Mental Status: He is alert and oriented to person, place, and time.   Psychiatric:         Mood and Affect: Mood normal.         Behavior: Behavior normal.         Thought Content: Thought content normal.         Judgment: Judgment normal.         Home Medications:  Prior to Admission medications    Medication Sig Start Date End Date Taking? Authorizing Provider   acetaminophen (Tylenol) 500 mg tablet TAKE 2 TABLETS BY MOUTH EVERY 6-8 HOURS FOR PAIN 8/15/23 8/14/24  Juan Little,    cetirizine (ZyrTEC) 10 mg tablet Take by mouth once daily.    Historical Provider, MD   cholecalciferol (Vitamin D-3) 125 MCG (5000 UT) capsule Take by mouth once daily.    Historical Provider, MD   cholestyramine (Questran) 4 gram packet Take 1 packet (4 g) by mouth 2 times a day. 1/31/24 7/29/24  Josafat Barillas MD   cyclobenzaprine (Flexeril) 5 mg tablet  8/15/23   Historical Provider, MD   docosahexaenoic acid/epa (FISH OIL ORAL) Take by mouth once daily.    Historical Provider, MD   fluticasone (Flonase) 50 mcg/actuation nasal spray Administer 2 sprays into affected nostril(s) once daily. 8/8/19   Historical Provider, MD NEIL Castrejon. bifidum-B longum (Probiotic Colon Care) 1.5 billion cell capsule Take by mouth once daily.    Historical Provider, MD   lisinopril 20 mg tablet Take 1 tablet (20 mg) by mouth once daily. 1/31/24   Josafat Barillas MD   meloxicam (Mobic) 15 mg tablet   "5/3/17   Historical Provider, MD   mesalamine (Delzicol) 400 mg DR capsule Take 2 capsules (800 mg) by mouth 2 times a day. 1/31/24   Josafat Barillas MD   pantoprazole (ProtoNix) 40 mg EC tablet  8/15/23   Historical Provider, MD   pantoprazole (ProtoNix) 40 mg EC tablet TAKE 1 TABLET BY MOUTH ONCE DAILY  Patient not taking: Reported on 11/6/2023 8/15/23 8/14/24  Juan Little DO   sennosides (Senokot) 8.6 mg tablet TAKE 1 TABLET BY MOUTH TWO TIMES A DAY AS NEEDED TO PREVENT CONSTIPATION  Patient not taking: Reported on 11/6/2023 8/15/23 8/14/24  Juan Little DO   tamsulosin (Flomax) 0.4 mg 24 hr capsule Take by mouth. 10/20/17   Sky Orellana MD         Relevant Results Recent labs reviewed in the EMR.    Lab Results   Component Value Date/Time    WBC 8.4 01/30/2024 1359    HGB 16.2 01/30/2024 1359    HGB 15.0 08/17/2023 0424    HGB 17.9 (H) 08/01/2023 1343    HGB 17.2 06/20/2023 0905    MCV 81 01/30/2024 1359     01/30/2024 1359     08/17/2023 0424     08/01/2023 1343     06/20/2023 0905    IRON 70 06/20/2023 0903    TIBC 288 06/20/2023 0903    IRONSAT 24 (L) 06/20/2023 0903    FERRITIN 282 06/20/2023 0903       Lab Results   Component Value Date/Time     01/30/2024 1359    K 4.0 01/30/2024 1359     01/30/2024 1359    BUN 23 01/30/2024 1359    CREATININE 1.19 01/30/2024 1359    CREATININE 1.39 (H) 08/17/2023 0424    CREATININE 1.21 08/01/2023 1343       Lab Results   Component Value Date/Time    BILITOT 0.5 01/30/2024 1359    BILITOT 0.4 08/17/2023 0424    BILITOT 0.5 06/20/2023 0903    BILITOT 0.5 01/24/2023 0943    ALKPHOS 85 01/30/2024 1359    ALKPHOS 73 08/17/2023 0424    ALKPHOS 89 06/20/2023 0903    ALKPHOS 105 01/24/2023 0943    AST 14 01/30/2024 1359    AST 26 08/17/2023 0424    AST 14 06/20/2023 0903    AST 14 01/24/2023 0943    ALT 12 01/30/2024 1359    ALT 16 08/17/2023 0424    ALT 19 06/20/2023 0903    ALT 17 01/24/2023 0943       No results found for: \"CRP\", " "\"CALPS\"    Radiology: Imaging reviewed in the EMR.  No results found.    === 05/12/23 ===    CT PELVIS W IV CONTRAST    - Impression -  No CT evidence of acute process within the pelvis. Incidental  findings as above.      "

## 2024-03-19 ENCOUNTER — TELEPHONE (OUTPATIENT)
Dept: UROLOGY | Facility: CLINIC | Age: 74
End: 2024-03-19
Payer: MEDICARE

## 2024-03-19 DIAGNOSIS — N40.1 BENIGN PROSTATIC HYPERPLASIA WITH LOWER URINARY TRACT SYMPTOMS, SYMPTOM DETAILS UNSPECIFIED: Primary | ICD-10-CM

## 2024-03-19 RX ORDER — TAMSULOSIN HYDROCHLORIDE 0.4 MG/1
0.8 CAPSULE ORAL DAILY
Qty: 180 CAPSULE | Refills: 3 | Status: SHIPPED | OUTPATIENT
Start: 2024-03-19 | End: 2025-03-19

## 2024-03-19 NOTE — TELEPHONE ENCOUNTER
Pt's wife is calling in needing a refill for tamsulosin 0.8 mg sent over to the pharmacy. (Pt is aware it will be in after 6pm Thursday)

## 2024-03-25 ENCOUNTER — ANESTHESIA EVENT (OUTPATIENT)
Dept: GASTROENTEROLOGY | Facility: HOSPITAL | Age: 74
End: 2024-03-25
Payer: MEDICARE

## 2024-03-25 ENCOUNTER — PREP FOR PROCEDURE (OUTPATIENT)
Dept: GASTROENTEROLOGY | Facility: CLINIC | Age: 74
End: 2024-03-25
Payer: MEDICARE

## 2024-03-25 RX ORDER — SODIUM CHLORIDE 9 MG/ML
20 INJECTION, SOLUTION INTRAVENOUS CONTINUOUS
Status: CANCELLED | OUTPATIENT
Start: 2024-03-25

## 2024-03-26 ENCOUNTER — HOSPITAL ENCOUNTER (OUTPATIENT)
Dept: GASTROENTEROLOGY | Facility: HOSPITAL | Age: 74
Discharge: HOME | End: 2024-03-26
Payer: MEDICARE

## 2024-03-26 ENCOUNTER — ANESTHESIA (OUTPATIENT)
Dept: GASTROENTEROLOGY | Facility: HOSPITAL | Age: 74
End: 2024-03-26
Payer: MEDICARE

## 2024-03-26 VITALS
RESPIRATION RATE: 18 BRPM | HEART RATE: 90 BPM | DIASTOLIC BLOOD PRESSURE: 115 MMHG | TEMPERATURE: 97.3 F | SYSTOLIC BLOOD PRESSURE: 186 MMHG | OXYGEN SATURATION: 96 %

## 2024-03-26 DIAGNOSIS — K51.90 ULCERATIVE COLITIS WITHOUT COMPLICATIONS, UNSPECIFIED LOCATION (MULTI): ICD-10-CM

## 2024-03-26 DIAGNOSIS — Z12.11 SCREENING FOR COLON CANCER: ICD-10-CM

## 2024-03-26 PROCEDURE — 2500000004 HC RX 250 GENERAL PHARMACY W/ HCPCS (ALT 636 FOR OP/ED): Performed by: INTERNAL MEDICINE

## 2024-03-26 PROCEDURE — 3700000002 HC GENERAL ANESTHESIA TIME - EACH INCREMENTAL 1 MINUTE

## 2024-03-26 PROCEDURE — 7100000010 HC PHASE TWO TIME - EACH INCREMENTAL 1 MINUTE

## 2024-03-26 PROCEDURE — 45385 COLONOSCOPY W/LESION REMOVAL: CPT | Performed by: INTERNAL MEDICINE

## 2024-03-26 PROCEDURE — 45380 COLONOSCOPY AND BIOPSY: CPT | Performed by: INTERNAL MEDICINE

## 2024-03-26 PROCEDURE — 2500000004 HC RX 250 GENERAL PHARMACY W/ HCPCS (ALT 636 FOR OP/ED): Performed by: NURSE ANESTHETIST, CERTIFIED REGISTERED

## 2024-03-26 PROCEDURE — 3700000001 HC GENERAL ANESTHESIA TIME - INITIAL BASE CHARGE

## 2024-03-26 PROCEDURE — 88305 TISSUE EXAM BY PATHOLOGIST: CPT | Mod: TC,PORLAB | Performed by: INTERNAL MEDICINE

## 2024-03-26 PROCEDURE — 2500000005 HC RX 250 GENERAL PHARMACY W/O HCPCS: Performed by: NURSE ANESTHETIST, CERTIFIED REGISTERED

## 2024-03-26 PROCEDURE — 88305 TISSUE EXAM BY PATHOLOGIST: CPT

## 2024-03-26 PROCEDURE — 7100000009 HC PHASE TWO TIME - INITIAL BASE CHARGE

## 2024-03-26 RX ORDER — LIDOCAINE HCL/PF 100 MG/5ML
SYRINGE (ML) INTRAVENOUS AS NEEDED
Status: DISCONTINUED | OUTPATIENT
Start: 2024-03-26 | End: 2024-03-26

## 2024-03-26 RX ORDER — SODIUM CHLORIDE 9 MG/ML
20 INJECTION, SOLUTION INTRAVENOUS CONTINUOUS
Status: DISCONTINUED | OUTPATIENT
Start: 2024-03-26 | End: 2024-03-27 | Stop reason: HOSPADM

## 2024-03-26 RX ORDER — PROPOFOL 10 MG/ML
INJECTION, EMULSION INTRAVENOUS AS NEEDED
Status: DISCONTINUED | OUTPATIENT
Start: 2024-03-26 | End: 2024-03-26

## 2024-03-26 RX ADMIN — SODIUM CHLORIDE 20 ML/HR: 9 INJECTION, SOLUTION INTRAVENOUS at 08:45

## 2024-03-26 RX ADMIN — PROPOFOL 240 MG: 10 INJECTION, EMULSION INTRAVENOUS at 09:41

## 2024-03-26 RX ADMIN — LIDOCAINE HYDROCHLORIDE 100 MG: 20 INJECTION, SOLUTION INTRAVENOUS at 09:41

## 2024-03-26 SDOH — HEALTH STABILITY: MENTAL HEALTH: CURRENT SMOKER: 0

## 2024-03-26 ASSESSMENT — COLUMBIA-SUICIDE SEVERITY RATING SCALE - C-SSRS
2. HAVE YOU ACTUALLY HAD ANY THOUGHTS OF KILLING YOURSELF?: NO
1. IN THE PAST MONTH, HAVE YOU WISHED YOU WERE DEAD OR WISHED YOU COULD GO TO SLEEP AND NOT WAKE UP?: NO
6. HAVE YOU EVER DONE ANYTHING, STARTED TO DO ANYTHING, OR PREPARED TO DO ANYTHING TO END YOUR LIFE?: NO

## 2024-03-26 ASSESSMENT — PAIN SCALES - GENERAL
PAINLEVEL_OUTOF10: 0 - NO PAIN

## 2024-03-26 ASSESSMENT — PAIN - FUNCTIONAL ASSESSMENT
PAIN_FUNCTIONAL_ASSESSMENT: 0-10

## 2024-03-26 NOTE — ANESTHESIA POSTPROCEDURE EVALUATION
Patient: Jaime Mcnair    Procedure Summary       Date: 03/26/24 Room / Location: [facility]    Anesthesia Start: 0933 Anesthesia Stop: 1009    Procedure: COLONOSCOPY Diagnosis:       Ulcerative colitis without complications, unspecified location (CMS/[facility])      Screening for colon cancer    Scheduled Providers: Roman Baez MD Responsible Provider: MADI Peraza    Anesthesia Type: MAC ASA Status: 3            Anesthesia Type: MAC    Vitals Value Taken Time   /67 03/26/24 1017   Temp 37 °C (98.6 °F) 03/26/24 1006   Pulse 89 03/26/24 1017   Resp 18 03/26/24 1017   SpO2 96 % 03/26/24 1017       Anesthesia Post Evaluation    Patient location during evaluation: bedside  Patient participation: complete - patient participated  Level of consciousness: awake  Pain management: adequate  Airway patency: patent  Cardiovascular status: acceptable  Respiratory status: acceptable  Hydration status: acceptable  Postoperative Nausea and Vomiting: none    There were no known notable events for this encounter.

## 2024-03-26 NOTE — ANESTHESIA PREPROCEDURE EVALUATION
Patient: Jaime Mcnair    Procedure Information       Date/Time: 03/26/24 0930    Scheduled providers: Roman Baez MD    Procedure: COLONOSCOPY    Location: [facility]            Relevant Problems   Anesthesia (within normal limits)      Cardiovascular   (+) Essential hypertension   (+) Hyperlipidemia      Endocrine   (+) Obesity      GI   (+) Gastroesophageal reflux disease   (+) Ulcerative colitis (CMS/HCC)      Hematology   (+) Polycythemia      Musculoskeletal   (+) Osteoarthritis of right hip       Clinical information reviewed:   Tobacco  Allergies  Meds   Med Hx  Surg Hx   Fam Hx  Soc Hx        NPO Detail:  NPO/Void Status  Date of Last Liquid: 03/26/24  Time of Last Liquid: 0500  Date of Last Solid: 03/24/24  Time of Last Solid: 1800  Last Intake Type: Clear fluids         Physical Exam    Airway  Mallampati: III     Cardiovascular - normal exam     Dental   (+) upper dentures, lower dentures     Pulmonary - normal exam     Abdominal      Other findings: Dentures at home          Anesthesia Plan    History of general anesthesia?: yes  History of complications of general anesthesia?: no    ASA 3     The patient is not a current smoker.    Anesthetic plan and risks discussed with patient.  Use of blood products discussed with who consented to blood products.

## 2024-03-26 NOTE — LETTER
April 4, 2024     Jaime Mcnair  [address] [address]      Dear Mr. Mcnair:    Below are the results from your recent visit:      The polyp that I removed during your recent colonoscopy was a tubular adenoma on pathology.  This type of polyp is not a cancer, but it is the type of polyp that could grow into a cancer over time.  Any polyps that were removed will not grow into a cancer, but having polyps like this can increase your risk of developing other polyps or eventually a cancer.  Because of that risk I would recommend that you have another colonoscopy in about 1 year to look for other polyps.     The other biopsies were normal and showed no evidence of any inflammatory changes.        If you have any other questions or concerns please do not hesitate to call me at my office at [phone].     Repeat Colonoscopy Interval: 1 years            Sincerely,        Roman Baez MD          Resulted Orders   Surgical Pathology Exam   Result Value Ref Range    Case Report       Surgical Pathology                                Case: [id number]                                  Authorizing Provider:  Roman Baez MD        Collected:           03/26/2024 0949              Ordering Location:     [facility]    Received:            03/26/2024 1115                                     [facility]                                                                     Pathologist:           Kika Ramsey MD                                                            Specimens:   A) - COLON - ASCENDING BIOPSY                                                                       B) - COLON - TRANSVERSE BIOPSY                                                                      C) - COLON - DESCENDING BIOPSY                                                                      D) - ASCENDING COLON POLYP                                                                 FINAL DIAGNOSIS       A. Right  "colon, biopsy:  - Colonic mucosa with no significant pathologic findings, negative for granulomas or dysplasia.    B. Transverse colon, biopsy:  - Colonic mucosa with no significant pathologic findings, negative for granulomas or dysplasia.    C. Left colon, biopsy:  - Colonic mucosa with no significant pathologic findings, negative for granulomas or dysplasia.    D. Ascending colon polyp:  - Tubular adenoma.              By the signature on this report, the individual or group listed as making the Final Interpretation/Diagnosis certifies that they have reviewed this case.       Clinical History       Ulcerative colitis K51.90  Screening for colon cancer Z12.11      Gross Description       A: Received in formalin, labeled with the patient's name and hospital number and \"1\", are 2 fragments of tan, soft tissue aggregating to 0.7 x 0.3 x 0.2 cm. The specimen is submitted in toto in one cassette.  MKM  B: Received in formalin, labeled with the patient's name and hospital number and \"2\", are 2 fragments of tan, soft tissue aggregating to 0.7 x 0.3 x 0.2 cm. The specimen is submitted in toto in one cassette.  MKM  C: Received in formalin, labeled with the patient's name and hospital number and \"3\", are multiple fragments of tan, soft tissue aggregating to 0.8 x 0.3 x 0.2 cm. The specimen is submitted in toto in one cassette.  MKM  D: Received in formalin, labeled with the patient's name and hospital number and \"4\", are 2 fragments of tan, soft tissue aggregating to 0.7 x 0.4 x 0.2 cm. The specimen is submitted in toto in one cassette.  MKM              "

## 2024-03-27 ENCOUNTER — TELEPHONE (OUTPATIENT)
Dept: GASTROENTEROLOGY | Facility: CLINIC | Age: 74
End: 2024-03-27
Payer: MEDICARE

## 2024-03-27 NOTE — ADDENDUM NOTE
Encounter addended by: Ray Motta RN on: 3/27/2024 1:54 PM   Actions taken: Contacts section saved, Flowsheet accepted

## 2024-04-03 LAB
LABORATORY COMMENT REPORT: NORMAL
PATH REPORT.FINAL DX SPEC: NORMAL
PATH REPORT.GROSS SPEC: NORMAL
PATH REPORT.RELEVANT HX SPEC: NORMAL
PATH REPORT.TOTAL CANCER: NORMAL

## 2024-04-26 ENCOUNTER — APPOINTMENT (OUTPATIENT)
Dept: GASTROENTEROLOGY | Facility: CLINIC | Age: 74
End: 2024-04-26
Payer: MEDICARE

## 2024-04-29 ENCOUNTER — OFFICE VISIT (OUTPATIENT)
Dept: GASTROENTEROLOGY | Facility: CLINIC | Age: 74
End: 2024-04-29
Payer: MEDICARE

## 2024-04-29 VITALS — HEIGHT: 68 IN | BODY MASS INDEX: 29.7 KG/M2 | WEIGHT: 196 LBS | OXYGEN SATURATION: 95 % | HEART RATE: 91 BPM

## 2024-04-29 DIAGNOSIS — K51.90 ULCERATIVE COLITIS WITHOUT COMPLICATIONS, UNSPECIFIED LOCATION (MULTI): Primary | ICD-10-CM

## 2024-04-29 PROCEDURE — 1160F RVW MEDS BY RX/DR IN RCRD: CPT | Performed by: INTERNAL MEDICINE

## 2024-04-29 PROCEDURE — 99213 OFFICE O/P EST LOW 20 MIN: CPT | Performed by: INTERNAL MEDICINE

## 2024-04-29 PROCEDURE — 1123F ACP DISCUSS/DSCN MKR DOCD: CPT | Performed by: INTERNAL MEDICINE

## 2024-04-29 PROCEDURE — 1036F TOBACCO NON-USER: CPT | Performed by: INTERNAL MEDICINE

## 2024-04-29 PROCEDURE — 1159F MED LIST DOCD IN RCRD: CPT | Performed by: INTERNAL MEDICINE

## 2024-04-29 ASSESSMENT — ENCOUNTER SYMPTOMS
ARTHRALGIAS: 1
WHEEZING: 0
VOICE CHANGE: 0
BACK PAIN: 1
DYSURIA: 0
NUMBNESS: 0
TROUBLE SWALLOWING: 0
UNEXPECTED WEIGHT CHANGE: 0
ADENOPATHY: 0
SORE THROAT: 0
FATIGUE: 0
HEMATURIA: 0
BRUISES/BLEEDS EASILY: 0
ROS GI COMMENTS: AS DETAILED ABOVE.
FEVER: 0
HEADACHES: 0
CONFUSION: 0
DIZZINESS: 0
SEIZURES: 0
NERVOUS/ANXIOUS: 0
CHILLS: 0
SHORTNESS OF BREATH: 0
MYALGIAS: 0
PALPITATIONS: 0
COUGH: 0
WEAKNESS: 0

## 2024-04-29 NOTE — PROGRESS NOTES
[facility]astroenterology    ASSESSMENT and PLAN:       Jaime Mcnair is a 73 y.o. male with a significant past medical history of bladder cancer, HTN, HLD, BPH, OA, and GERD who presents for follow up of UC.       UC  Very unclear remote diagnosis. Unfortunately no records or details regarding this diagnosis are available. His reported history is a little atypical for UC and I wonder if the episode that led to this diagnosis was instead some other form of acute self limited colitis rather than UC. Recent colonoscopy was normal with normal pathology as well. He has been on 5-ASA without difficulty. If he truly had UC in the past he is currently in good remission (histologic remission). We discussed the option of stopping Mesalamine. Currently he wants to keep everything the same and not make any changes.  - continue 5-ASA  - repeat colonoscopy in 2-3 years for surveillance      Follow up in 1 year.        Roman Baez MD        Gastroenterology    [facility]    Clinical   [facility]        Subjective   HISTORY OF PRESENT ILLNESS:     Chief Complaint  Follow-up    History Of Present Illness:    Jaime Mcnair is a 73 y.o. male with a significant past medical history of bladder cancer, HTN, HLD, BPH, OA, and GERD who presents for follow up of UC.     he follows with (Josafat Barillas MD) as his PCP.       His medication list includes cholestyramine, meloxicam, pantoprazole, senna, and mesalamine.     He was recently seen by his PCP who reported that he had a history of UC that was well controlled on Mesalamine and Cholestyramine. He did recommend surveillance colonoscopy and he was subsequently referred to GI.     There are no previous GI notes in the EMR and no endoscopy reports.     Labs have shown an unremarkable CBC, unremarkable CMP, and normal iron/ferritin. He previously had a CT in May 2023 that showed a normal  "colon. A previous CT in April 2023 showed mild transverse colon wall thickening, but was otherwise normal.     I initially saw him in March at which time he reported that he was diagnosed with \"colitis\" in 2011, but he can't recall the details that led to that diagnosis. He thinks that he had a colonoscopy in 2014 that was normal. He says that he was started on Mesalamine in 2011 and he has been taking it since that time. He says that he is using Cholestyramine since he had his gallbladder removed. He does not think that he was ever on any other medications for UC and he says that he really hasn't had any issues with it since the time of his diagnosis. Intermittently he has had some heartburn, but feels like pantoprazole controls that well.     After his initial visit we did a colonoscopy on 3/26/2024 which showed an 8 mm ascending polyp (TA on path), but was otherwise normal. Biopsies throughout the colon were normal with no inflammatory changes. I had also ordered labs which have not been done.      Today he says that he has continued to do well. Recently he says that he has not had any GI symptoms. He has 1-2 formed BMs per day. He specifically denies any abdominal pain, diarrhea, or rectal bleeding.        Patient denies any heartburn/GERD, N/V, dysphagia, odynophagia, abdominal pain, diarrhea, constipation, hematemesis, hematochezia, melena, or weight loss.    Review of systems:   Review of Systems   Constitutional:  Negative for chills, fatigue, fever and unexpected weight change.   HENT:  Negative for congestion, sneezing, sore throat, trouble swallowing and voice change.    Respiratory:  Negative for cough, shortness of breath and wheezing.    Cardiovascular:  Negative for chest pain, palpitations and leg swelling.   Gastrointestinal:         As detailed above.   Genitourinary:  Negative for dysuria and hematuria.   Musculoskeletal:  Positive for arthralgias and back pain. Negative for myalgias.   Skin:  " "Negative for pallor and rash.   Neurological:  Negative for dizziness, seizures, syncope, weakness, numbness and headaches.   Hematological:  Negative for adenopathy. Does not bruise/bleed easily.   Psychiatric/Behavioral:  Negative for confusion. The patient is not nervous/anxious.    All other systems reviewed and are negative.      I performed a complete 10 point review of systems and it is negative except as noted in HPI or above.      PAST HISTORIES:       Past Medical History:  He has a past medical history of GERD (gastroesophageal reflux disease) and Hypertension.    Past Surgical History:  He has a past surgical history that includes Other surgical history (08/26/2019); Other surgical history (08/26/2019); Other surgical history (08/26/2019); IR injection joint (07/28/2020); IR injection joint (03/25/2021); IR injection joint (08/12/2021); IR injection joint (02/11/2022); IR injection joint (09/08/2022); IR injection joint (03/09/2023); and Bladder tumor excision.      Social History:  He reports that he has never smoked. He has never used smokeless tobacco. He reports that he does not currently use alcohol. He reports that he does not use drugs.    Family History:  No known GI disease, specifically denies any family history of pancreatitis, Crohn's, colon cancer, gastroesophageal cancer, or ulcerative colitis.    Family History   Problem Relation Name Age of Onset   • Throat cancer Mother     • Hypertension Father     • Other (bladder cancer) Father     • Breast cancer Neg Hx          Allergies:  Benicar [olmesartan], Omeprazole, Pepto-bismol [bismuth subsalicylate], and Penicillins      Objective   OBJECTIVE:       Last Recorded Vitals:  Vitals:    04/29/24 1322   Pulse: 91   SpO2: 95%   Weight: 88.9 kg (196 lb)   Height: 1.727 m (5' 8\")     Pulse 91   Ht 1.727 m (5' 8\")   Wt 88.9 kg (196 lb)   SpO2 95%   BMI 29.80 kg/m²      Physical Exam:    Physical Exam  Vitals reviewed.   Constitutional:       " General: He is not in acute distress.     Appearance: He is obese. He is not ill-appearing.   HENT:      Head: Normocephalic and atraumatic.   Eyes:      General: No scleral icterus.  Cardiovascular:      Rate and Rhythm: Normal rate and regular rhythm.      Pulses: Normal pulses.      Heart sounds: Normal heart sounds. No murmur heard.  Pulmonary:      Effort: Pulmonary effort is normal. No respiratory distress.      Breath sounds: Normal breath sounds. No wheezing.   Abdominal:      General: Bowel sounds are normal.      Palpations: Abdomen is soft.      Tenderness: There is no abdominal tenderness. There is no rebound.   Musculoskeletal:         General: No swelling or deformity.   Skin:     General: Skin is warm and dry.      Coloration: Skin is not jaundiced.      Findings: No rash.   Neurological:      General: No focal deficit present.      Mental Status: He is alert and oriented to person, place, and time.   Psychiatric:         Mood and Affect: Mood normal.         Behavior: Behavior normal.         Thought Content: Thought content normal.         Judgment: Judgment normal.         Home Medications:  Prior to Admission medications    Medication Sig Start Date End Date Taking? Authorizing Provider   acetaminophen (Tylenol) 500 mg tablet TAKE 2 TABLETS BY MOUTH EVERY 6-8 HOURS FOR PAIN 8/15/23 8/14/24  Juan Little,    cetirizine (ZyrTEC) 10 mg tablet Take by mouth once daily.    Historical Provider, MD   cholecalciferol (Vitamin D-3) 125 MCG (5000 UT) capsule Take by mouth once daily.    Historical Provider, MD   cholestyramine (Questran) 4 gram packet Take 1 packet (4 g) by mouth 2 times a day. 1/31/24 7/29/24  Josafat Barillas MD   docosahexaenoic acid/epa (FISH OIL ORAL) Take by mouth once daily.    Historical Provider, MD   fluticasone (Flonase) 50 mcg/actuation nasal spray Administer 2 sprays into affected nostril(s) once daily. 8/8/19   Historical Provider, MD NEIL kirk-B longpiyush  (Probiotic Colon Care) 1.5 billion cell capsule Take by mouth once daily.    Historical Provider, MD   lisinopril 20 mg tablet Take 1 tablet (20 mg) by mouth once daily. 1/31/24   Josafat Barillas MD   mesalamine (Delzicol) 400 mg DR capsule Take 2 capsules (800 mg) by mouth 2 times a day. 1/31/24   Josafat Barillas MD   pantoprazole (ProtoNix) 40 mg EC tablet  8/15/23   Historical Provider, MD   pantoprazole (ProtoNix) 40 mg EC tablet TAKE 1 TABLET BY MOUTH ONCE DAILY  Patient not taking: Reported on 11/6/2023 8/15/23 8/14/24  Juan Little DO   sennosides (Senokot) 8.6 mg tablet TAKE 1 TABLET BY MOUTH TWO TIMES A DAY AS NEEDED TO PREVENT CONSTIPATION 8/15/23 8/14/24  Juan Little DO   tamsulosin (Flomax) 0.4 mg 24 hr capsule Take 2 capsules (0.8 mg) by mouth once daily. 3/19/24 3/19/25  Sky Orellana MD         Relevant Results Recent labs reviewed in the EMR.    Lab Results   Component Value Date/Time    WBC 8.4 01/30/2024 1359    HGB 16.2 01/30/2024 1359    HGB 15.0 08/17/2023 0424    HGB 17.9 (H) 08/01/2023 1343    HGB 17.2 06/20/2023 0905    MCV 81 01/30/2024 1359     01/30/2024 1359     08/17/2023 0424     08/01/2023 1343     06/20/2023 0905    IRON 70 06/20/2023 0903    TIBC 288 06/20/2023 0903    IRONSAT 24 (L) 06/20/2023 0903    FERRITIN 282 06/20/2023 0903       Lab Results   Component Value Date/Time     01/30/2024 1359    K 4.0 01/30/2024 1359     01/30/2024 1359    BUN 23 01/30/2024 1359    CREATININE 1.19 01/30/2024 1359    CREATININE 1.39 (H) 08/17/2023 0424    CREATININE 1.21 08/01/2023 1343       Lab Results   Component Value Date/Time    BILITOT 0.5 01/30/2024 1359    BILITOT 0.4 08/17/2023 0424    BILITOT 0.5 06/20/2023 0903    BILITOT 0.5 01/24/2023 0943    ALKPHOS 85 01/30/2024 1359    ALKPHOS 73 08/17/2023 0424    ALKPHOS 89 06/20/2023 0903    ALKPHOS 105 01/24/2023 0943    AST 14 01/30/2024 1359    AST 26 08/17/2023 0424    AST 14 06/20/2023 0903    AST  "14 01/24/2023 0943    ALT 12 01/30/2024 1359    ALT 16 08/17/2023 0424    ALT 19 06/20/2023 0903    ALT 17 01/24/2023 0943       No results found for: \"CRP\", \"CALPS\"    Radiology: Imaging reviewed in the EMR.  No results found.    === 05/12/23 ===    CT PELVIS W IV CONTRAST    - Impression -  No CT evidence of acute process within the pelvis. Incidental  findings as above.      "

## 2024-04-29 NOTE — PATIENT INSTRUCTIONS
Inflammatory Bowel Disease (Crohn's and Ulcerative Colitis)  It is recommended that you have an influenza vaccination (the flu shot) every year.  You should not receive the live version of this vaccination which is inhaled.  It is safe for you to receive the injection form of this vaccination.    You should receive the COVID-19 vaccination and all recommended boosters. This vaccine is safe for patients with inflammatory bowel disease.    If you are over the age of 50 you should consider vaccination against Herpes Zoster (shingles).    You should have a skin exam with a dermatologist every year to screen for melanoma and other forms of skin cancer.    You should be screened for osteoporosis (low bone density) with an imaging test called DEXA.    Other medications such as NSAIDs like Ibuprofen, Aleve, Naproxen, Motrin, or Meloxicam may lead to worsening of your inflammatory bowel disease.  These medications should be avoided.    If you use tobacco products (cigarettes, chewing tobacco, etc) you should quit.  Talk to your primary care provider about ways to help you quit.    There are many resources available online with information regarding your disease.  It can be confusing to identify a reliable source of information.  I recommend that you start with the following.  - American College of Gastroenterology (www.gi.org/patients)     - Section on Inflammatory Bowel Disease (www.gi.org/topics/inflammatory-bowel-disease)  - Crohn's and Colitis Foundation (www.crohnscolitisfoundation.org)       5-ASA  You have been prescribed a form of Mesalamine for treatment of your inflammatory bowel disease.  There are many brand names of this medication available and the instructions for taking it vary from one form to another, but all versions include Mesalamine as the active ingredient.  This is a topical anti-inflammatory drug for the intestine.    Labs will be monitored while you are on this medication.    Possible side effects  of this medication include headache, abdominal pain, constipation, or dizziness.     There is an increased risk of liver toxicity, pulmonary fibrosis, renal impairment, bone marrow suppression, lymphoma, and skin cancer with this medication.    You should receive a pneumonia vaccination.  Talk to your primary care provider for further information about this vaccine.    Women on this medication should have yearly cervical cancer screening.  Talk to your primary care provider for further information.    If you run into problems while taking this medication you should call my office.         Follow up in 1 year.

## 2024-06-17 ENCOUNTER — LAB (OUTPATIENT)
Dept: LAB | Facility: LAB | Age: 74
End: 2024-06-17
Payer: MEDICARE

## 2024-06-17 DIAGNOSIS — D75.1 POLYCYTHEMIA: ICD-10-CM

## 2024-06-17 LAB
BASOPHILS # BLD AUTO: 0.05 X10*3/UL (ref 0–0.1)
BASOPHILS NFR BLD AUTO: 0.6 %
EOSINOPHIL # BLD AUTO: 0.4 X10*3/UL (ref 0–0.4)
EOSINOPHIL NFR BLD AUTO: 4.8 %
ERYTHROCYTE [DISTWIDTH] IN BLOOD BY AUTOMATED COUNT: 16.7 % (ref 11.5–14.5)
HCT VFR BLD AUTO: 51.7 % (ref 41–52)
HGB BLD-MCNC: 17.4 G/DL (ref 13.5–17.5)
IMM GRANULOCYTES # BLD AUTO: 0.04 X10*3/UL (ref 0–0.5)
IMM GRANULOCYTES NFR BLD AUTO: 0.5 % (ref 0–0.9)
LYMPHOCYTES # BLD AUTO: 1.97 X10*3/UL (ref 0.8–3)
LYMPHOCYTES NFR BLD AUTO: 23.8 %
MCH RBC QN AUTO: 26.8 PG (ref 26–34)
MCHC RBC AUTO-ENTMCNC: 33.7 G/DL (ref 32–36)
MCV RBC AUTO: 80 FL (ref 80–100)
MONOCYTES # BLD AUTO: 0.65 X10*3/UL (ref 0.05–0.8)
MONOCYTES NFR BLD AUTO: 7.9 %
NEUTROPHILS # BLD AUTO: 5.15 X10*3/UL (ref 1.6–5.5)
NEUTROPHILS NFR BLD AUTO: 62.4 %
NRBC BLD-RTO: 0 /100 WBCS (ref 0–0)
PLATELET # BLD AUTO: 219 X10*3/UL (ref 150–450)
RBC # BLD AUTO: 6.49 X10*6/UL (ref 4.5–5.9)
WBC # BLD AUTO: 8.3 X10*3/UL (ref 4.4–11.3)

## 2024-06-17 PROCEDURE — 85025 COMPLETE CBC W/AUTO DIFF WBC: CPT

## 2024-06-17 PROCEDURE — 36415 COLL VENOUS BLD VENIPUNCTURE: CPT

## 2024-06-24 ENCOUNTER — OFFICE VISIT (OUTPATIENT)
Dept: HEMATOLOGY/ONCOLOGY | Facility: CLINIC | Age: 74
End: 2024-06-24
Payer: MEDICARE

## 2024-06-24 VITALS
DIASTOLIC BLOOD PRESSURE: 97 MMHG | BODY MASS INDEX: 30.2 KG/M2 | OXYGEN SATURATION: 93 % | RESPIRATION RATE: 18 BRPM | SYSTOLIC BLOOD PRESSURE: 164 MMHG | HEART RATE: 101 BPM | TEMPERATURE: 97.2 F | WEIGHT: 198.63 LBS

## 2024-06-24 DIAGNOSIS — D75.1 POLYCYTHEMIA: ICD-10-CM

## 2024-06-24 PROCEDURE — 36415 COLL VENOUS BLD VENIPUNCTURE: CPT | Performed by: NURSE PRACTITIONER

## 2024-06-24 PROCEDURE — 1160F RVW MEDS BY RX/DR IN RCRD: CPT | Performed by: NURSE PRACTITIONER

## 2024-06-24 PROCEDURE — 1159F MED LIST DOCD IN RCRD: CPT | Performed by: NURSE PRACTITIONER

## 2024-06-24 PROCEDURE — 1123F ACP DISCUSS/DSCN MKR DOCD: CPT | Performed by: NURSE PRACTITIONER

## 2024-06-24 PROCEDURE — 1126F AMNT PAIN NOTED NONE PRSNT: CPT | Performed by: NURSE PRACTITIONER

## 2024-06-24 PROCEDURE — 3080F DIAST BP >= 90 MM HG: CPT | Performed by: NURSE PRACTITIONER

## 2024-06-24 PROCEDURE — 99213 OFFICE O/P EST LOW 20 MIN: CPT | Performed by: NURSE PRACTITIONER

## 2024-06-24 PROCEDURE — 3077F SYST BP >= 140 MM HG: CPT | Performed by: NURSE PRACTITIONER

## 2024-06-24 PROCEDURE — 1036F TOBACCO NON-USER: CPT | Performed by: NURSE PRACTITIONER

## 2024-06-24 ASSESSMENT — PAIN SCALES - GENERAL: PAINLEVEL: 0-NO PAIN

## 2024-06-24 NOTE — PROGRESS NOTES
Patient ID: Jaime Pak is a 73 y.o. male.  Referring Physician: No referring provider defined for this encounter.  Primary Care Provider: Josafat Barillas MD  Visit Type: Follow Up      Subjective          Patient Visit Information:   Visit Type: Follow Up Visit      History of Present Illness:      ID Statement:    JAIME PAK is a 72 year old Male        Chief Complaint: Erythrocytosis   Interval History:    1.  Erythrocytosis, JAK2 negative     Patient is a 73-year-old gentleman with a history of hypertension, hypercholesterolemia, benign prostate hypertrophy, diverticulosis, patient went for routine examination and a CBC done on November 8, 2016 did show WBC count 8.1, RBC 6.5, hemoglobin 17.7,  hematocrit 52.8, platelets 196.     Serum iron 64, serum ferritin 105,   BUN 21, serum creatinine 1.32, calcium 9.1, total bilirubin 0.6, AST 19, AST 14, alkaline phosphatase 96, TSH 2.58.  B12 level 959.     CBC was repeated on December 30, 2016 did show WBC count 9.0, RBC 6.6, hemoglobin 17.6, hematocrit 54, platelets 228.   He was evaluated for erythrocytosis.     Patient denied any headache, hot flashes, no chest pain, no shortness of breath, no abdominal pain, no nausea vomiting, no itching, no history of blood clot, no night sweats,     Serum erythropoietin level was 10.7 and a Oliver 2 mutation was not seen.     6/24/2024  Patient still has erythrocytosis, questionable etiology, JAK2 negative, hematocrit 51.7. Patient did have hip surgery 10 months ago and hematocrit decreased to 44.2 at that time.   No headache, no dizziness, no nasal congestion, no chest pain, no shortness of breath, no nausea vomiting, no abdominal pain, no night sweats, no joint pain.      Patient has right hip pain due to osteoarthritis going for orthopedic evaluation         Review of Systems   All other systems reviewed and are negative.     Objective   BSA: 2.08 meters squared  BP (!) 164/97 (BP Location: Left arm, Patient Position:  Sitting, BP Cuff Size: Adult)   Pulse 101   Temp 36.2 °C (97.2 °F) (Temporal)   Resp 18   Wt 90.1 kg (198 lb 10.2 oz)   SpO2 93%   BMI 30.20 kg/m²      has a past medical history of GERD (gastroesophageal reflux disease) and Hypertension.   has a past surgical history that includes Other surgical history (08/26/2019); Other surgical history (08/26/2019); Other surgical history (08/26/2019); IR injection joint (07/28/2020); IR injection joint (03/25/2021); IR injection joint (08/12/2021); IR injection joint (02/11/2022); IR injection joint (09/08/2022); IR injection joint (03/09/2023); and Bladder tumor excision.  Family History   Problem Relation Name Age of Onset    Throat cancer Mother      Hypertension Father      Other (bladder cancer) Father      Breast cancer Neg Hx         Jaime Mcnair  reports that he has never smoked. He has never used smokeless tobacco.  He  reports that he does not currently use alcohol.  He  reports no history of drug use.    Physical Exam  HENT:      Head: Normocephalic.      Nose: Nose normal.      Mouth/Throat:      Mouth: Mucous membranes are moist.   Eyes:      Pupils: Pupils are equal, round, and reactive to light.   Cardiovascular:      Rate and Rhythm: Normal rate and regular rhythm.      Pulses: Normal pulses.      Heart sounds: Normal heart sounds.   Pulmonary:      Effort: Pulmonary effort is normal.      Breath sounds: Normal breath sounds.   Abdominal:      General: Bowel sounds are normal.      Palpations: Abdomen is soft.   Musculoskeletal:         General: Normal range of motion.   Skin:     General: Skin is warm and dry.   Neurological:      General: No focal deficit present.      Mental Status: He is alert and oriented to person, place, and time.   Psychiatric:         Mood and Affect: Mood normal.         Behavior: Behavior normal.       WBC   Date/Time Value Ref Range Status   06/17/2024 01:33 PM 8.3 4.4 - 11.3 x10*3/uL Final   01/30/2024 01:59 PM 8.4 4.4 - 11.3  x10*3/uL Final   08/17/2023 04:24 AM 13.4 (H) 4.4 - 11.3 x10E9/L Final   08/01/2023 01:43 PM 9.1 4.4 - 11.3 x10E9/L Final   06/20/2023 09:05 AM 7.8 4.4 - 11.3 x10E9/L Final     nRBC   Date Value Ref Range Status   06/17/2024 0.0 0.0 - 0.0 /100 WBCs Final   01/30/2024 0.0 0.0 - 0.0 /100 WBCs Final     RBC   Date Value Ref Range Status   06/17/2024 6.49 (H) 4.50 - 5.90 x10*6/uL Final   01/30/2024 6.18 (H) 4.50 - 5.90 x10*6/uL Final   08/17/2023 5.46 4.50 - 5.90 x10E12/L Final   08/01/2023 6.55 (H) 4.50 - 5.90 x10E12/L Final   06/20/2023 6.32 (H) 4.50 - 5.90 x10E12/L Final     Hemoglobin   Date Value Ref Range Status   06/17/2024 17.4 13.5 - 17.5 g/dL Final   01/30/2024 16.2 13.5 - 17.5 g/dL Final   08/17/2023 15.0 13.5 - 17.5 g/dL Final   08/01/2023 17.9 (H) 13.5 - 17.5 g/dL Final   06/20/2023 17.2 13.5 - 17.5 g/dL Final     Hematocrit   Date Value Ref Range Status   06/17/2024 51.7 41.0 - 52.0 % Final   01/30/2024 50.1 41.0 - 52.0 % Final   08/17/2023 44.2 41.0 - 52.0 % Final   08/01/2023 54.0 (H) 41.0 - 52.0 % Final   06/20/2023 52.9 (H) 41.0 - 52.0 % Final     MCV   Date/Time Value Ref Range Status   06/17/2024 01:33 PM 80 80 - 100 fL Final   01/30/2024 01:59 PM 81 80 - 100 fL Final   08/17/2023 04:24 AM 81 80 - 100 fL Final   08/01/2023 01:43 PM 82 80 - 100 fL Final   06/20/2023 09:05 AM 84 80 - 100 fL Final     MCH   Date/Time Value Ref Range Status   06/17/2024 01:33 PM 26.8 26.0 - 34.0 pg Final   01/30/2024 01:59 PM 26.2 26.0 - 34.0 pg Final     MCHC   Date/Time Value Ref Range Status   06/17/2024 01:33 PM 33.7 32.0 - 36.0 g/dL Final   01/30/2024 01:59 PM 32.3 32.0 - 36.0 g/dL Final   08/17/2023 04:24 AM 33.9 32.0 - 36.0 g/dL Final   08/01/2023 01:43 PM 33.1 32.0 - 36.0 g/dL Final   06/20/2023 09:05 AM 32.5 32.0 - 36.0 g/dL Final     RDW   Date/Time Value Ref Range Status   06/17/2024 01:33 PM 16.7 (H) 11.5 - 14.5 % Final   01/30/2024 01:59 PM 16.5 (H) 11.5 - 14.5 % Final   08/17/2023 04:24 AM 14.8 (H) 11.5 -  "14.5 % Final   08/01/2023 01:43 PM 15.9 (H) 11.5 - 14.5 % Final   06/20/2023 09:05 AM 16.8 (H) 11.5 - 14.5 % Final     Platelets   Date/Time Value Ref Range Status   06/17/2024 01:33  150 - 450 x10*3/uL Final   01/30/2024 01:59  150 - 450 x10*3/uL Final   08/17/2023 04:24  150 - 450 x10E9/L Final   08/01/2023 01:43  150 - 450 x10E9/L Final   06/20/2023 09:05  150 - 450 x10E9/L Final     No results found for: \"MPV\"  Neutrophils %   Date/Time Value Ref Range Status   06/17/2024 01:33 PM 62.4 40.0 - 80.0 % Final   01/30/2024 01:59 PM 62.6 40.0 - 80.0 % Final   08/17/2023 04:24 AM 79.4 40.0 - 80.0 % Final   06/20/2023 09:05 AM 58.6 40.0 - 80.0 % Final   01/24/2023 09:43 AM 59.2 40.0 - 80.0 % Final     Immature Granulocytes %, Automated   Date/Time Value Ref Range Status   06/17/2024 01:33 PM 0.5 0.0 - 0.9 % Final     Comment:     Immature Granulocyte Count (IG) includes promyelocytes, myelocytes and metamyelocytes but does not include bands. Percent differential counts (%) should be interpreted in the context of the absolute cell counts (cells/UL).   01/30/2024 01:59 PM 0.4 0.0 - 0.9 % Final     Comment:     Immature Granulocyte Count (IG) includes promyelocytes, myelocytes and metamyelocytes but does not include bands. Percent differential counts (%) should be interpreted in the context of the absolute cell counts (cells/UL).   08/17/2023 04:24 AM 0.4 0.0 - 0.9 % Final     Comment:      Immature Granulocyte Count (IG) includes promyelocytes,    myelocytes and metamyelocytes but does not include bands.   Percent differential counts (%) should be interpreted in the   context of the absolute cell counts (cells/L).     06/20/2023 09:05 AM 0.4 0.0 - 0.9 % Final     Comment:      Immature Granulocyte Count (IG) includes promyelocytes,    myelocytes and metamyelocytes but does not include bands.   Percent differential counts (%) should be interpreted in the   context of the absolute cell counts " (cells/L).     01/24/2023 09:43 AM 0.5 0.0 - 0.9 % Final     Comment:      Immature Granulocyte Count (IG) includes promyelocytes,    myelocytes and metamyelocytes but does not include bands.   Percent differential counts (%) should be interpreted in the   context of the absolute cell counts (cells/L).       Lymphocytes %   Date/Time Value Ref Range Status   06/17/2024 01:33 PM 23.8 13.0 - 44.0 % Final   01/30/2024 01:59 PM 22.2 13.0 - 44.0 % Final   08/17/2023 04:24 AM 8.9 13.0 - 44.0 % Final   06/20/2023 09:05 AM 24.7 13.0 - 44.0 % Final   01/24/2023 09:43 AM 26.1 13.0 - 44.0 % Final     Monocytes %   Date/Time Value Ref Range Status   06/17/2024 01:33 PM 7.9 2.0 - 10.0 % Final   01/30/2024 01:59 PM 9.9 2.0 - 10.0 % Final   08/17/2023 04:24 AM 11.0 2.0 - 10.0 % Final   06/20/2023 09:05 AM 9.0 2.0 - 10.0 % Final   01/24/2023 09:43 AM 8.1 2.0 - 10.0 % Final     Eosinophils %   Date/Time Value Ref Range Status   06/17/2024 01:33 PM 4.8 0.0 - 6.0 % Final   01/30/2024 01:59 PM 4.2 0.0 - 6.0 % Final   08/17/2023 04:24 AM 0.1 0.0 - 6.0 % Final   06/20/2023 09:05 AM 6.7 0.0 - 6.0 % Final   01/24/2023 09:43 AM 5.4 0.0 - 6.0 % Final     Basophils %   Date/Time Value Ref Range Status   06/17/2024 01:33 PM 0.6 0.0 - 2.0 % Final   01/30/2024 01:59 PM 0.7 0.0 - 2.0 % Final   08/17/2023 04:24 AM 0.2 0.0 - 2.0 % Final   06/20/2023 09:05 AM 0.6 0.0 - 2.0 % Final   01/24/2023 09:43 AM 0.7 0.0 - 2.0 % Final     Neutrophils Absolute   Date/Time Value Ref Range Status   06/17/2024 01:33 PM 5.15 1.60 - 5.50 x10*3/uL Final     Comment:     Percent differential counts (%) should be interpreted in the context of the absolute cell counts (cells/uL).   01/30/2024 01:59 PM 5.24 1.60 - 5.50 x10*3/uL Final     Comment:     Percent differential counts (%) should be interpreted in the context of the absolute cell counts (cells/uL).   08/17/2023 04:24 AM 10.60 (H) 1.60 - 5.50 x10E9/L Final   06/20/2023 09:05 AM 4.58 1.60 - 5.50 x10E9/L Final    01/24/2023 09:43 AM 5.80 (H) 1.60 - 5.50 x10E9/L Final     Immature Granulocytes Absolute, Automated   Date/Time Value Ref Range Status   06/17/2024 01:33 PM 0.04 0.00 - 0.50 x10*3/uL Final   01/30/2024 01:59 PM 0.03 0.00 - 0.50 x10*3/uL Final     Lymphocytes Absolute   Date/Time Value Ref Range Status   06/17/2024 01:33 PM 1.97 0.80 - 3.00 x10*3/uL Final   01/30/2024 01:59 PM 1.86 0.80 - 3.00 x10*3/uL Final   08/17/2023 04:24 AM 1.19 0.80 - 3.00 x10E9/L Final   06/20/2023 09:05 AM 1.93 0.80 - 3.00 x10E9/L Final   01/24/2023 09:43 AM 2.56 0.80 - 3.00 x10E9/L Final     Monocytes Absolute   Date/Time Value Ref Range Status   06/17/2024 01:33 PM 0.65 0.05 - 0.80 x10*3/uL Final   01/30/2024 01:59 PM 0.83 (H) 0.05 - 0.80 x10*3/uL Final   08/17/2023 04:24 AM 1.47 (H) 0.05 - 0.80 x10E9/L Final   06/20/2023 09:05 AM 0.70 0.05 - 0.80 x10E9/L Final   01/24/2023 09:43 AM 0.79 0.05 - 0.80 x10E9/L Final     Eosinophils Absolute   Date/Time Value Ref Range Status   06/17/2024 01:33 PM 0.40 0.00 - 0.40 x10*3/uL Final   01/30/2024 01:59 PM 0.35 0.00 - 0.40 x10*3/uL Final   08/17/2023 04:24 AM 0.01 0.00 - 0.40 x10E9/L Final   06/20/2023 09:05 AM 0.52 (H) 0.00 - 0.40 x10E9/L Final   01/24/2023 09:43 AM 0.53 (H) 0.00 - 0.40 x10E9/L Final     Basophils Absolute   Date/Time Value Ref Range Status   06/17/2024 01:33 PM 0.05 0.00 - 0.10 x10*3/uL Final   01/30/2024 01:59 PM 0.06 0.00 - 0.10 x10*3/uL Final   08/17/2023 04:24 AM 0.03 0.00 - 0.10 x10E9/L Final   06/20/2023 09:05 AM 0.05 0.00 - 0.10 x10E9/L Final   01/24/2023 09:43 AM 0.07 0.00 - 0.10 x10E9/L Final     Assessment/Plan    Assessment and Plan:   Assessment:    #1 erythrocytosis ,   Calvin 2 negative, serum erythropoietin level 15.4, recent Hematocrit 51     #2 hypertension     #3 ex-smoker     #4 prostate hypertrophy      Patient is advised to have regular exercise.     Patient was to call if she has any complaints including headache, dizziness, chest pain, any bleeding or  history of blood clots.     PLAN: Patient will be reevaluated after one year we will check CBC.    He should contact us in the interim if questions or problems arise. He should continue to follow up with his PCP for other co-morbid conditions.    I had an extensive discussion with the patient regarding the diagnosis and discussed the plan of therapy, including general considerations regarding side effects and outcomes. Pt understood and gave appropriate teach back about the plan of care. All questions were answered to the patient's satisfaction. The patient is instructed to contact us at any time if questions or problems arise. Thank you for the opportunity to participate in the care of this very pleasant patient.       Problem List Items Addressed This Visit             ICD-10-CM    Polycythemia D75.1    Relevant Orders    CBC and Auto Differential    Clinic Appointment Request Follow up; CASAL, ROSANNE M    CBC and Auto Differential    Comprehensive Metabolic Panel    Ferritin       Rosanne M Casal, APRN-CNP, DNP

## 2024-06-24 NOTE — PATIENT INSTRUCTIONS
Follow up with Karrie in 1year.   Have labs done in out patient lab in 1year before this appointment

## 2024-07-31 ENCOUNTER — APPOINTMENT (OUTPATIENT)
Dept: PRIMARY CARE | Facility: CLINIC | Age: 74
End: 2024-07-31
Payer: MEDICARE

## 2024-07-31 VITALS
OXYGEN SATURATION: 93 % | DIASTOLIC BLOOD PRESSURE: 72 MMHG | TEMPERATURE: 97.7 F | BODY MASS INDEX: 30.35 KG/M2 | WEIGHT: 199.6 LBS | HEART RATE: 90 BPM | SYSTOLIC BLOOD PRESSURE: 132 MMHG

## 2024-07-31 DIAGNOSIS — K51.90 ULCERATIVE COLITIS WITHOUT COMPLICATIONS, UNSPECIFIED LOCATION (MULTI): ICD-10-CM

## 2024-07-31 DIAGNOSIS — Z85.51 HISTORY OF BLADDER CANCER: ICD-10-CM

## 2024-07-31 DIAGNOSIS — I10 ESSENTIAL HYPERTENSION: Primary | ICD-10-CM

## 2024-07-31 DIAGNOSIS — N40.1 BENIGN PROSTATIC HYPERPLASIA WITH LOWER URINARY TRACT SYMPTOMS, SYMPTOM DETAILS UNSPECIFIED: ICD-10-CM

## 2024-07-31 DIAGNOSIS — Z13.6 SCREENING FOR CARDIOVASCULAR CONDITION: ICD-10-CM

## 2024-07-31 DIAGNOSIS — D75.1 POLYCYTHEMIA: ICD-10-CM

## 2024-07-31 PROCEDURE — 99214 OFFICE O/P EST MOD 30 MIN: CPT | Performed by: INTERNAL MEDICINE

## 2024-07-31 PROCEDURE — 1159F MED LIST DOCD IN RCRD: CPT | Performed by: INTERNAL MEDICINE

## 2024-07-31 PROCEDURE — G2211 COMPLEX E/M VISIT ADD ON: HCPCS | Performed by: INTERNAL MEDICINE

## 2024-07-31 PROCEDURE — 3078F DIAST BP <80 MM HG: CPT | Performed by: INTERNAL MEDICINE

## 2024-07-31 PROCEDURE — 1123F ACP DISCUSS/DSCN MKR DOCD: CPT | Performed by: INTERNAL MEDICINE

## 2024-07-31 PROCEDURE — 3075F SYST BP GE 130 - 139MM HG: CPT | Performed by: INTERNAL MEDICINE

## 2024-07-31 RX ORDER — MESALAMINE 400 MG/1
800 CAPSULE, DELAYED RELEASE ORAL 2 TIMES DAILY
Qty: 360 CAPSULE | Refills: 1 | Status: SHIPPED | OUTPATIENT
Start: 2024-07-31

## 2024-07-31 RX ORDER — CHOLESTYRAMINE 4 G/9G
1 POWDER, FOR SUSPENSION ORAL 2 TIMES DAILY
Qty: 180 PACKET | Refills: 1 | Status: SHIPPED | OUTPATIENT
Start: 2024-07-31 | End: 2025-01-27

## 2024-07-31 RX ORDER — LISINOPRIL 20 MG/1
20 TABLET ORAL DAILY
Qty: 90 TABLET | Refills: 1 | Status: SHIPPED | OUTPATIENT
Start: 2024-07-31

## 2024-07-31 ASSESSMENT — ENCOUNTER SYMPTOMS
RESPIRATORY NEGATIVE: 1
PSYCHIATRIC NEGATIVE: 1
ENDOCRINE NEGATIVE: 1
MUSCULOSKELETAL NEGATIVE: 1
ALLERGIC/IMMUNOLOGIC NEGATIVE: 1
NEUROLOGICAL NEGATIVE: 1
GASTROINTESTINAL NEGATIVE: 1
CONSTITUTIONAL NEGATIVE: 1
HEMATOLOGIC/LYMPHATIC NEGATIVE: 1
EYES NEGATIVE: 1
CARDIOVASCULAR NEGATIVE: 1

## 2024-07-31 NOTE — PROGRESS NOTES
Subjective   Patient ID: Jaime Mcnair is a 74 y.o. male who presents for 6 month follow up.  Patient presents today in follow up re:   HTN, GERD and allergic rhinitis.    Patient presents in follow up regarding hypertension.  Blood pressure has been well controlled on current therapy.  No adverse effects of medication reported.  Patient denies chest pain, palpitations, shortness of breath, orthopnea, fatigue or edema.    Patient reports reflux symptoms have been well controlled with current therapy.  Does have occasional flare of Sx after eating certain food.  No dysphagia, regurgitation or other associated complaints.    Rhinitis Sx well compensated on current therapy.          Review of Systems   Constitutional: Negative.    HENT: Negative.     Eyes: Negative.    Respiratory: Negative.     Cardiovascular: Negative.    Gastrointestinal: Negative.    Endocrine: Negative.    Genitourinary: Negative.    Musculoskeletal: Negative.    Skin: Negative.    Allergic/Immunologic: Negative.    Neurological: Negative.    Hematological: Negative.    Psychiatric/Behavioral: Negative.         Objective     Blood pressure 132/72, pulse 90, temperature 36.5 °C (97.7 °F), temperature source Temporal, weight 90.5 kg (199 lb 9.6 oz), SpO2 93%.   Physical Exam  Vitals reviewed.   Constitutional:       Appearance: Normal appearance.   Neck:      Vascular: No carotid bruit.   Cardiovascular:      Rate and Rhythm: Normal rate and regular rhythm.      Pulses: Normal pulses.      Heart sounds: Normal heart sounds. No murmur heard.  Pulmonary:      Effort: Pulmonary effort is normal.      Breath sounds: Normal breath sounds. No wheezing or rales.   Abdominal:      General: Abdomen is flat. There is no distension.      Palpations: Abdomen is soft.      Tenderness: There is no abdominal tenderness.   Musculoskeletal:         General: Normal range of motion.      Cervical back: Normal range of motion and neck supple.   Skin:     General: Skin  is warm and dry.   Neurological:      General: No focal deficit present.      Mental Status: He is alert and oriented to person, place, and time.   Psychiatric:         Mood and Affect: Mood normal.         Behavior: Behavior normal.         Assessment/Plan   Problem List Items Addressed This Visit       Benign prostatic hyperplasia    Relevant Orders    Prostate Specific Antigen    Essential hypertension - Primary    Relevant Medications    lisinopril 20 mg tablet    Other Relevant Orders    CBC and Auto Differential    Comprehensive Metabolic Panel    History of bladder cancer    Polycythemia    Relevant Orders    CBC and Auto Differential    Ulcerative colitis (Multi)    Relevant Medications    mesalamine (Delzicol) 400 mg DR capsule    cholestyramine (Questran) 4 gram packet     Other Visit Diagnoses       Screening for cardiovascular condition        Relevant Orders    Lipid Panel            BP well controlled on current therapy.  Will continue present therapy and follow.  GERD Sx well compensated on current therapy.  Will continue present therapy and follow clinically.  Rhinitis Sx well compensated on current therapy.  Will continue present therapy and follow.  UC Sx well controlled on current therapy.  Will continue.  Patient had  surveillance colonoscopy per Dr. Baez of GI dept for which showed no active inflammation and 1 polyp which was a tubular adenoma.  He continues to follow with Urology re:  bladder and prostate hx and Sx.  Patient underwent successful right hip arthroplasty last August per Dr. Little and is doing well.  He continues to follow with Heme re:  polycythemia.  Will check annual screening lab prior to next OV in 6 months.    Follow up in 6 months  Lab to be drawn 1 week prior to next office visit.

## 2024-08-19 ENCOUNTER — APPOINTMENT (OUTPATIENT)
Dept: ORTHOPEDIC SURGERY | Facility: CLINIC | Age: 74
End: 2024-08-19
Payer: MEDICARE

## 2024-08-20 ENCOUNTER — APPOINTMENT (OUTPATIENT)
Dept: ORTHOPEDIC SURGERY | Facility: CLINIC | Age: 74
End: 2024-08-20
Payer: MEDICARE

## 2024-08-20 ENCOUNTER — HOSPITAL ENCOUNTER (OUTPATIENT)
Dept: RADIOLOGY | Facility: CLINIC | Age: 74
Discharge: HOME | End: 2024-08-20
Payer: MEDICARE

## 2024-08-20 VITALS — WEIGHT: 199 LBS | BODY MASS INDEX: 30.16 KG/M2 | HEIGHT: 68 IN

## 2024-08-20 DIAGNOSIS — M16.11 PRIMARY OSTEOARTHRITIS OF RIGHT HIP: ICD-10-CM

## 2024-08-20 PROCEDURE — 1123F ACP DISCUSS/DSCN MKR DOCD: CPT | Performed by: ORTHOPAEDIC SURGERY

## 2024-08-20 PROCEDURE — 73502 X-RAY EXAM HIP UNI 2-3 VIEWS: CPT | Mod: RT

## 2024-08-20 PROCEDURE — 1036F TOBACCO NON-USER: CPT | Performed by: ORTHOPAEDIC SURGERY

## 2024-08-20 PROCEDURE — 73502 X-RAY EXAM HIP UNI 2-3 VIEWS: CPT | Mod: RIGHT SIDE | Performed by: RADIOLOGY

## 2024-08-20 PROCEDURE — 1159F MED LIST DOCD IN RCRD: CPT | Performed by: ORTHOPAEDIC SURGERY

## 2024-08-20 PROCEDURE — 99213 OFFICE O/P EST LOW 20 MIN: CPT | Performed by: ORTHOPAEDIC SURGERY

## 2024-08-20 ASSESSMENT — PAIN - FUNCTIONAL ASSESSMENT: PAIN_FUNCTIONAL_ASSESSMENT: 0-10

## 2024-08-20 ASSESSMENT — PAIN SCALES - GENERAL: PAINLEVEL_OUTOF10: 5 - MODERATE PAIN

## 2024-08-20 NOTE — PROGRESS NOTES
ORTHOPEDIC TOTAL JOINT  POST-OPERATIVE FOLLOW UP      ============================  IMPRESSION/PLAN:  ============================  74 y.o. male s/p Right Total Hip Replacement completed on 8/16/2023 .    PLAN:  -patient is doing excellent 1 year out from surgery.  Current x-rays reviewed with him.  No complaints at this time.  May progress with activities as tolerated.  Follow-up as needed.        Jaime Mcnair presents today for follow up of joint replacement above.  Has some pain in the hip.  Ambulates with no assistive device.  Done with physical therapy. XR done today.    Review of Systems:   Constitutional: See HPI for pain assessment, No significant weight loss, recent trauma. Denies fevers/chills  Cardiovascular: No chest pain, shortness of breath  Respiratory: No difficulty breathing, cough  Gastrointestinal: No nausea, vomiting, diarrhea, constipation  Musculoskeletal: Noted in HPI, no arthralgias   Integumentary: No rashes, easy bruising, redness   Neurological: no numbness or tingling in extremities, no gait disturbances     Patient Active Problem List   Diagnosis    Allergic rhinitis    Benign prostatic hyperplasia    Gastroesophageal reflux disease    Essential hypertension    History of bladder cancer    IFG (impaired fasting glucose)    Polycythemia    Osteoarthritis of right hip    Ulcerative colitis (Multi)    Nocturia    Right lower quadrant abdominal pain    History of right hip replacement    Leg weakness    Obesity    Hyperlipidemia    Back pain       =================================  EXAM  =================================  GENERAL: A/Ox3, NAD. Appears healthy, well nourished  CARDIAC: regular rate  LUNGS: Breathing non-labored    MUSCULOSKELETAL:  Laterality: right Hip exam  - Strength: Abduction 5/5, Flexion 5/5  - Palpation: non TTP along surgical site  - Incision: Well-healed  - EHL/PF/DF motor intact  - compartments soft, negative homans  - Gait: using no assistive  device    NEUROVASCULAR:  - Neurovascular Status: sensation intact to light touch distally  - Capillary refill brisk at extremities, Bilateral dorsalis pedis pulse 2+      IMAGING: Multiple views right hip and pelvis reviewed by me which demonstrate stable right hip replacement with no signs of loosening or failure when compared to immediate postoperative x-rays. X-rays were personally reviewed by me.  Radiology reports were reviewed by me as well, if available at the time.        Juan Little DO  Attending Surgeon  Joint Replacement and Adult Reconstructive Surgery  [place], [place]

## 2024-10-28 ENCOUNTER — TELEPHONE (OUTPATIENT)
Dept: UROLOGY | Facility: CLINIC | Age: 74
End: 2024-10-28
Payer: MEDICARE

## 2024-10-28 DIAGNOSIS — N40.1 BENIGN PROSTATIC HYPERPLASIA WITH LOWER URINARY TRACT SYMPTOMS, SYMPTOM DETAILS UNSPECIFIED: ICD-10-CM

## 2024-10-28 RX ORDER — TAMSULOSIN HYDROCHLORIDE 0.4 MG/1
0.8 CAPSULE ORAL DAILY
Qty: 180 CAPSULE | Refills: 3 | Status: SHIPPED | OUTPATIENT
Start: 2024-10-28 | End: 2025-10-28

## 2024-12-02 ENCOUNTER — APPOINTMENT (OUTPATIENT)
Dept: UROLOGY | Facility: CLINIC | Age: 74
End: 2024-12-02
Payer: MEDICARE

## 2024-12-02 VITALS
HEIGHT: 68 IN | WEIGHT: 190 LBS | DIASTOLIC BLOOD PRESSURE: 85 MMHG | SYSTOLIC BLOOD PRESSURE: 131 MMHG | BODY MASS INDEX: 28.79 KG/M2 | HEART RATE: 97 BPM

## 2024-12-02 DIAGNOSIS — C67.9 MALIGNANT NEOPLASM OF URINARY BLADDER, UNSPECIFIED SITE (MULTI): Primary | ICD-10-CM

## 2024-12-02 DIAGNOSIS — N40.1 BENIGN PROSTATIC HYPERPLASIA WITH LOWER URINARY TRACT SYMPTOMS, SYMPTOM DETAILS UNSPECIFIED: ICD-10-CM

## 2024-12-02 LAB
POC APPEARANCE, URINE: CLEAR
POC BILIRUBIN, URINE: NEGATIVE
POC BLOOD, URINE: NEGATIVE
POC COLOR, URINE: YELLOW
POC GLUCOSE, URINE: NEGATIVE MG/DL
POC KETONES, URINE: NEGATIVE MG/DL
POC LEUKOCYTES, URINE: NEGATIVE
POC NITRITE,URINE: NEGATIVE
POC PH, URINE: 5.5 PH
POC PROTEIN, URINE: NEGATIVE MG/DL
POC SPECIFIC GRAVITY, URINE: 1.02
POC UROBILINOGEN, URINE: 0.2 EU/DL

## 2024-12-02 PROCEDURE — 3079F DIAST BP 80-89 MM HG: CPT | Performed by: UROLOGY

## 2024-12-02 PROCEDURE — 1123F ACP DISCUSS/DSCN MKR DOCD: CPT | Performed by: UROLOGY

## 2024-12-02 PROCEDURE — 81003 URINALYSIS AUTO W/O SCOPE: CPT | Performed by: UROLOGY

## 2024-12-02 PROCEDURE — 99213 OFFICE O/P EST LOW 20 MIN: CPT | Performed by: UROLOGY

## 2024-12-02 PROCEDURE — 1159F MED LIST DOCD IN RCRD: CPT | Performed by: UROLOGY

## 2024-12-02 PROCEDURE — 3075F SYST BP GE 130 - 139MM HG: CPT | Performed by: UROLOGY

## 2024-12-02 NOTE — PROGRESS NOTES
12/02/2024  History of bladder cancer, low-grade 7 years ago    We discussed the yearly cystoscopy versus close watch clinically  All the questions were answered, the patient expressed understanding and agreed to the plan.    Impression  History of bladder cancer  BPH    Plan  Cystoscopy in OR under local-declined  Yearly follow-up  Call if blood in urine    Chief Complaint   Patient presents with    Follow-up     Pt here for a follow up no blood no burning no leakage   Pt had a Cystoscopy on 10/05/2023      Pt said he needs med refill  Physical Exam     TODAYS LAB RESULTS:    OCT UA Automated manually resulted  Order: [id number]   Status: Final result       Visible to patient: No (inaccessible in  MyChart)       Next appt: 01/30/2025 at 02:00 PM in [facility] (Josafat Barillas MD)       Dx: Benign prostatic hyperplasia with low...    0 Result Notes      Component  Ref Range & Units 14:08   POC Color, Urine  Straw, Yellow, Light-Yellow Yellow   POC Appearance, Urine  Clear Clear   POC Glucose, Urine  NEGATIVE mg/dl NEGATIVE   POC Bilirubin, Urine  NEGATIVE NEGATIVE   POC Ketones, Urine  NEGATIVE mg/dl NEGATIVE   POC Specific Gravity, Urine  1.005 - 1.035 1.025   POC Blood, Urine  NEGATIVE NEGATIVE   POC PH, Urine  No Reference Range Established PH 5.5   POC Protein, Urine  NEGATIVE, 30 (1+) mg/dl NEGATIVE   POC Urobilinogen, Urine  0.2, 1.0 EU/DL 0.2   Poc Nitrite, Urine  NEGATIVE NEGATIVE   POC Leukocytes, Urine  NEGATIVE NEGATIVE        ASSESSMENT&PLAN:      IMPRESSIONS:     08/26/2019  Voiding well, only complains his arthritis     Patient has no nausea, no vomiting, no fever.     PSA normal     OZZIE deferred     We discussed benign prostate hypertrophy, continue Flomax 0.4 mg daily  We discussed the cessation of PSA check and cystoscopy  All the questions were answered, the patient expressed understanding and agreed to the plan.     Impression  BPH  Dysuria  remote history of bladder cancer     Plan  Continue  Flomax 0.4 mg daily  Appointment in 1 year        8/14/18   HBM Patient is doing well and voiding without difficulty using tamsulosin 0.4 mg 1 tablet daily he denies any side effects from the medication he states he has a good urinary stream empties his bladder well and nocturia is 1-2 he denies any blood burning or urinary incontinence.        PSA  1/30/2024 3.95  1/24/2023 2.92  8/19/19 -2.82      Surgery  10/5/2023 OR cystoscopy Dr. Bourne  2017 TURBT low-grade papillary urothelial carcinoma low-grade

## 2025-01-23 ENCOUNTER — LAB (OUTPATIENT)
Dept: LAB | Facility: LAB | Age: 75
End: 2025-01-23
Payer: MEDICARE

## 2025-01-23 DIAGNOSIS — I10 ESSENTIAL HYPERTENSION: ICD-10-CM

## 2025-01-23 DIAGNOSIS — Z13.6 SCREENING FOR CARDIOVASCULAR CONDITION: ICD-10-CM

## 2025-01-23 DIAGNOSIS — N40.1 BENIGN PROSTATIC HYPERPLASIA WITH LOWER URINARY TRACT SYMPTOMS, SYMPTOM DETAILS UNSPECIFIED: ICD-10-CM

## 2025-01-23 DIAGNOSIS — D75.1 POLYCYTHEMIA: ICD-10-CM

## 2025-01-23 LAB
ALBUMIN SERPL BCP-MCNC: 4 G/DL (ref 3.4–5)
ALP SERPL-CCNC: 108 U/L (ref 33–136)
ALT SERPL W P-5'-P-CCNC: 14 U/L (ref 10–52)
ANION GAP SERPL CALC-SCNC: 12 MMOL/L (ref 10–20)
AST SERPL W P-5'-P-CCNC: 14 U/L (ref 9–39)
BASOPHILS # BLD AUTO: 0.06 X10*3/UL (ref 0–0.1)
BASOPHILS NFR BLD AUTO: 0.7 %
BILIRUB SERPL-MCNC: 0.6 MG/DL (ref 0–1.2)
BUN SERPL-MCNC: 21 MG/DL (ref 6–23)
CALCIUM SERPL-MCNC: 8.9 MG/DL (ref 8.6–10.3)
CHLORIDE SERPL-SCNC: 102 MMOL/L (ref 98–107)
CHOLEST SERPL-MCNC: 205 MG/DL (ref 0–199)
CHOLESTEROL/HDL RATIO: 6.2
CO2 SERPL-SCNC: 26 MMOL/L (ref 21–32)
CREAT SERPL-MCNC: 1.29 MG/DL (ref 0.5–1.3)
EGFRCR SERPLBLD CKD-EPI 2021: 57 ML/MIN/1.73M*2
EOSINOPHIL # BLD AUTO: 0.4 X10*3/UL (ref 0–0.4)
EOSINOPHIL NFR BLD AUTO: 4.8 %
ERYTHROCYTE [DISTWIDTH] IN BLOOD BY AUTOMATED COUNT: 17.5 % (ref 11.5–14.5)
GLUCOSE SERPL-MCNC: 81 MG/DL (ref 74–99)
HCT VFR BLD AUTO: 56.8 % (ref 41–52)
HDLC SERPL-MCNC: 33 MG/DL
HGB BLD-MCNC: 18.2 G/DL (ref 13.5–17.5)
IMM GRANULOCYTES # BLD AUTO: 0.04 X10*3/UL (ref 0–0.5)
IMM GRANULOCYTES NFR BLD AUTO: 0.5 % (ref 0–0.9)
LDLC SERPL CALC-MCNC: 113 MG/DL
LYMPHOCYTES # BLD AUTO: 2.09 X10*3/UL (ref 0.8–3)
LYMPHOCYTES NFR BLD AUTO: 24.9 %
MCH RBC QN AUTO: 26.4 PG (ref 26–34)
MCHC RBC AUTO-ENTMCNC: 32 G/DL (ref 32–36)
MCV RBC AUTO: 82 FL (ref 80–100)
MONOCYTES # BLD AUTO: 0.73 X10*3/UL (ref 0.05–0.8)
MONOCYTES NFR BLD AUTO: 8.7 %
NEUTROPHILS # BLD AUTO: 5.09 X10*3/UL (ref 1.6–5.5)
NEUTROPHILS NFR BLD AUTO: 60.4 %
NON HDL CHOLESTEROL: 172 MG/DL (ref 0–149)
NRBC BLD-RTO: 0 /100 WBCS (ref 0–0)
PLATELET # BLD AUTO: 189 X10*3/UL (ref 150–450)
POTASSIUM SERPL-SCNC: 4.2 MMOL/L (ref 3.5–5.3)
PROT SERPL-MCNC: 6.3 G/DL (ref 6.4–8.2)
PSA SERPL-MCNC: 3.76 NG/ML
RBC # BLD AUTO: 6.89 X10*6/UL (ref 4.5–5.9)
SODIUM SERPL-SCNC: 136 MMOL/L (ref 136–145)
TRIGL SERPL-MCNC: 297 MG/DL (ref 0–149)
VLDL: 59 MG/DL (ref 0–40)
WBC # BLD AUTO: 8.4 X10*3/UL (ref 4.4–11.3)

## 2025-01-23 PROCEDURE — 80061 LIPID PANEL: CPT

## 2025-01-23 PROCEDURE — 85025 COMPLETE CBC W/AUTO DIFF WBC: CPT

## 2025-01-23 PROCEDURE — 84153 ASSAY OF PSA TOTAL: CPT

## 2025-01-23 PROCEDURE — 80053 COMPREHEN METABOLIC PANEL: CPT

## 2025-01-30 ENCOUNTER — APPOINTMENT (OUTPATIENT)
Dept: PRIMARY CARE | Facility: CLINIC | Age: 75
End: 2025-01-30
Payer: MEDICARE

## 2025-01-30 VITALS
OXYGEN SATURATION: 93 % | TEMPERATURE: 96.7 F | BODY MASS INDEX: 29.92 KG/M2 | WEIGHT: 196.8 LBS | SYSTOLIC BLOOD PRESSURE: 139 MMHG | HEART RATE: 95 BPM | DIASTOLIC BLOOD PRESSURE: 62 MMHG

## 2025-01-30 DIAGNOSIS — Z85.51 HISTORY OF BLADDER CANCER: ICD-10-CM

## 2025-01-30 DIAGNOSIS — I10 ESSENTIAL HYPERTENSION: ICD-10-CM

## 2025-01-30 DIAGNOSIS — K51.90 ULCERATIVE COLITIS WITHOUT COMPLICATIONS, UNSPECIFIED LOCATION (MULTI): ICD-10-CM

## 2025-01-30 DIAGNOSIS — N40.1 BENIGN PROSTATIC HYPERPLASIA WITH LOWER URINARY TRACT SYMPTOMS, SYMPTOM DETAILS UNSPECIFIED: ICD-10-CM

## 2025-01-30 DIAGNOSIS — D75.1 POLYCYTHEMIA: ICD-10-CM

## 2025-01-30 DIAGNOSIS — J30.1 ALLERGIC RHINITIS DUE TO POLLEN, UNSPECIFIED SEASONALITY: ICD-10-CM

## 2025-01-30 DIAGNOSIS — Z00.00 MEDICARE ANNUAL WELLNESS VISIT, SUBSEQUENT: Primary | ICD-10-CM

## 2025-01-30 DIAGNOSIS — K21.9 CHRONIC GERD: ICD-10-CM

## 2025-01-30 PROCEDURE — 1158F ADVNC CARE PLAN TLK DOCD: CPT | Performed by: INTERNAL MEDICINE

## 2025-01-30 PROCEDURE — G0442 ANNUAL ALCOHOL SCREEN 15 MIN: HCPCS | Performed by: INTERNAL MEDICINE

## 2025-01-30 PROCEDURE — 1170F FXNL STATUS ASSESSED: CPT | Performed by: INTERNAL MEDICINE

## 2025-01-30 PROCEDURE — 1036F TOBACCO NON-USER: CPT | Performed by: INTERNAL MEDICINE

## 2025-01-30 PROCEDURE — 99214 OFFICE O/P EST MOD 30 MIN: CPT | Performed by: INTERNAL MEDICINE

## 2025-01-30 PROCEDURE — 3075F SYST BP GE 130 - 139MM HG: CPT | Performed by: INTERNAL MEDICINE

## 2025-01-30 PROCEDURE — G0444 DEPRESSION SCREEN ANNUAL: HCPCS | Performed by: INTERNAL MEDICINE

## 2025-01-30 PROCEDURE — G0439 PPPS, SUBSEQ VISIT: HCPCS | Performed by: INTERNAL MEDICINE

## 2025-01-30 PROCEDURE — 1159F MED LIST DOCD IN RCRD: CPT | Performed by: INTERNAL MEDICINE

## 2025-01-30 PROCEDURE — 1123F ACP DISCUSS/DSCN MKR DOCD: CPT | Performed by: INTERNAL MEDICINE

## 2025-01-30 PROCEDURE — 99397 PER PM REEVAL EST PAT 65+ YR: CPT | Performed by: INTERNAL MEDICINE

## 2025-01-30 PROCEDURE — 3078F DIAST BP <80 MM HG: CPT | Performed by: INTERNAL MEDICINE

## 2025-01-30 RX ORDER — MESALAMINE 400 MG/1
800 CAPSULE, DELAYED RELEASE ORAL 2 TIMES DAILY
Qty: 360 CAPSULE | Refills: 1 | Status: SHIPPED | OUTPATIENT
Start: 2025-01-30

## 2025-01-30 RX ORDER — CHOLESTYRAMINE 4 G/9G
1 POWDER, FOR SUSPENSION ORAL 2 TIMES DAILY
Qty: 180 PACKET | Refills: 1 | Status: SHIPPED | OUTPATIENT
Start: 2025-01-30 | End: 2025-07-29

## 2025-01-30 RX ORDER — LISINOPRIL 20 MG/1
20 TABLET ORAL DAILY
Qty: 90 TABLET | Refills: 1 | Status: SHIPPED | OUTPATIENT
Start: 2025-01-30

## 2025-01-30 ASSESSMENT — ENCOUNTER SYMPTOMS
GASTROINTESTINAL NEGATIVE: 1
EYES NEGATIVE: 1
OCCASIONAL FEELINGS OF UNSTEADINESS: 0
NEUROLOGICAL NEGATIVE: 1
MUSCULOSKELETAL NEGATIVE: 1
CONSTITUTIONAL NEGATIVE: 1
RESPIRATORY NEGATIVE: 1
ALLERGIC/IMMUNOLOGIC NEGATIVE: 1
HEMATOLOGIC/LYMPHATIC NEGATIVE: 1
CARDIOVASCULAR NEGATIVE: 1
DEPRESSION: 0
ENDOCRINE NEGATIVE: 1
LOSS OF SENSATION IN FEET: 0
PSYCHIATRIC NEGATIVE: 1

## 2025-01-30 ASSESSMENT — PATIENT HEALTH QUESTIONNAIRE - PHQ9
SUM OF ALL RESPONSES TO PHQ9 QUESTIONS 1 AND 2: 0
2. FEELING DOWN, DEPRESSED OR HOPELESS: NOT AT ALL
1. LITTLE INTEREST OR PLEASURE IN DOING THINGS: NOT AT ALL

## 2025-01-30 ASSESSMENT — ACTIVITIES OF DAILY LIVING (ADL)
BATHING: INDEPENDENT
GROCERY_SHOPPING: TOTAL CARE
MANAGING_FINANCES: TOTAL CARE
DRESSING: INDEPENDENT
TAKING_MEDICATION: TOTAL CARE
DOING_HOUSEWORK: INDEPENDENT

## 2025-01-30 NOTE — PROGRESS NOTES
Subjective   Patient ID: Jaime Mcnair is a 74 y.o. male who presents for 6 month follow up and Medicare Annual Wellness Visit Subsequent.  Patient presents today in follow up re:   HTN, GERD and allergic rhinitis.    Patient presents in follow up regarding hypertension.  Blood pressure has been well controlled on current therapy.  No adverse effects of medication reported.  Patient denies chest pain, palpitations, shortness of breath, orthopnea, fatigue or edema.    Patient reports reflux symptoms have been well controlled with current therapy.  Does have occasional flare of Sx after eating certain food.  No dysphagia, regurgitation or other associated complaints.    Rhinitis Sx well compensated on current therapy.          Review of Systems   Constitutional: Negative.    HENT: Negative.     Eyes: Negative.    Respiratory: Negative.     Cardiovascular: Negative.    Gastrointestinal: Negative.    Endocrine: Negative.    Genitourinary: Negative.    Musculoskeletal: Negative.    Skin: Negative.    Allergic/Immunologic: Negative.    Neurological: Negative.    Hematological: Negative.    Psychiatric/Behavioral: Negative.         Objective     Blood pressure 139/62, pulse 95, temperature 35.9 °C (96.7 °F), temperature source Temporal, weight 89.3 kg (196 lb 12.8 oz), SpO2 93%.   Physical Exam  Vitals reviewed.   Constitutional:       Appearance: Normal appearance.   HENT:      Head: Normocephalic and atraumatic.      Right Ear: External ear normal.      Left Ear: External ear normal.      Nose: Nose normal.      Mouth/Throat:      Mouth: Mucous membranes are moist.      Pharynx: Oropharynx is clear.   Eyes:      Conjunctiva/sclera: Conjunctivae normal.      Pupils: Pupils are equal, round, and reactive to light.   Neck:      Vascular: No carotid bruit.   Cardiovascular:      Rate and Rhythm: Normal rate and regular rhythm.      Pulses: Normal pulses.      Heart sounds: Normal heart sounds. No murmur heard.  Pulmonary:       Effort: Pulmonary effort is normal.      Breath sounds: Normal breath sounds. No wheezing or rales.   Abdominal:      General: Abdomen is flat. There is no distension.      Palpations: Abdomen is soft.      Tenderness: There is no abdominal tenderness. There is no right CVA tenderness or left CVA tenderness.   Musculoskeletal:         General: Normal range of motion.      Cervical back: Normal range of motion and neck supple.   Skin:     General: Skin is warm and dry.   Neurological:      General: No focal deficit present.      Mental Status: He is alert and oriented to person, place, and time.   Psychiatric:         Mood and Affect: Mood normal.         Behavior: Behavior normal.         Assessment/Plan   Problem List Items Addressed This Visit       Allergic rhinitis    Benign prostatic hyperplasia    Chronic GERD    Essential hypertension    Relevant Medications    lisinopril 20 mg tablet    History of bladder cancer    Polycythemia    Ulcerative colitis    Relevant Medications    mesalamine (Delzicol) 400 mg DR capsule    cholestyramine (Questran) 4 gram packet     Other Visit Diagnoses       Medicare annual wellness visit, subsequent    -  Primary            Medicare annual wellness completed with no new findings or issues identified.  He is up to date on all preventive measures.  Patient has documented advanced care planning and POA in place.  Depression screen is completed utilizing PHQ-2 with score of zero (0).  Alcohol screen is completed with no issues identified.  Physical exam completed as documented with no new findings.  BP well controlled on current therapy.  Will continue present therapy and follow.  GERD Sx well compensated on current therapy.  Will continue present therapy and follow clinically.  Rhinitis Sx well compensated on current therapy.  Will continue present therapy and follow.  UC Sx well controlled on current therapy.  Will continue.  Patient had  surveillance colonoscopy per Dr. Baez  last year.  He continues to follow with Urology re:  bladder and prostate hx and Sx.  Patient underwent successful right hip arthroplasty last August per Dr. Little and is doing well.  He continues to follow with Heme re:  polycythemia.  Annual screening lab reviewed with all parameters tested either WNL or at goal.      Follow up in 6 months  Lab to be drawn 1 week prior to next office visit.

## 2025-02-28 ENCOUNTER — TELEPHONE (OUTPATIENT)
Facility: CLINIC | Age: 75
End: 2025-02-28
Payer: MEDICARE

## 2025-02-28 NOTE — TELEPHONE ENCOUNTER
----- Message from Marcia SALDANA sent at 2/28/2025 10:08 AM EST -----  Regarding: RECALL  Per chart recall, pt due for repeat colonoscopy  - can you place the order for the screening colonoscopy and send to [facility] .  Thank you.    #Chart reviewed for age, BMI and anticoagulants.

## 2025-05-05 ENCOUNTER — APPOINTMENT (OUTPATIENT)
Dept: SURGERY | Facility: CLINIC | Age: 75
End: 2025-05-05
Payer: MEDICARE

## 2025-05-05 VITALS
SYSTOLIC BLOOD PRESSURE: 134 MMHG | DIASTOLIC BLOOD PRESSURE: 85 MMHG | WEIGHT: 198.6 LBS | HEIGHT: 68 IN | OXYGEN SATURATION: 93 % | BODY MASS INDEX: 30.1 KG/M2 | HEART RATE: 97 BPM

## 2025-05-05 DIAGNOSIS — Z85.51 HISTORY OF BLADDER CANCER: ICD-10-CM

## 2025-05-05 DIAGNOSIS — R10.31 RIGHT INGUINAL PAIN: Primary | ICD-10-CM

## 2025-05-05 PROCEDURE — 3075F SYST BP GE 130 - 139MM HG: CPT | Performed by: SURGERY

## 2025-05-05 PROCEDURE — 1123F ACP DISCUSS/DSCN MKR DOCD: CPT | Performed by: SURGERY

## 2025-05-05 PROCEDURE — 1036F TOBACCO NON-USER: CPT | Performed by: SURGERY

## 2025-05-05 PROCEDURE — 3079F DIAST BP 80-89 MM HG: CPT | Performed by: SURGERY

## 2025-05-05 PROCEDURE — 99204 OFFICE O/P NEW MOD 45 MIN: CPT | Performed by: SURGERY

## 2025-05-05 ASSESSMENT — ENCOUNTER SYMPTOMS
SHORTNESS OF BREATH: 0
FEVER: 0
PALPITATIONS: 0
HEADACHES: 0
ABDOMINAL PAIN: 1
COUGH: 0
CHILLS: 0
DIZZINESS: 0

## 2025-05-05 NOTE — PROGRESS NOTES
GENERAL SURGERY CLINIC NOTE    Jaime Mcnair   1950   [id number]     History Of Present Illness  Jaime Mcnair is a 74 y.o. male who presents to the office for evaluation of right scrotal/inguinal pain radiating to his right hip. He had 2 right inguinal hernia surgeries - repair with mesh placement in 1996 and right inguinal exploration with mesh removal and neurolysis, repair with plug/patch in 2010. He developed right inguinal pain and saw Dr. Fiore in 2023 for evaluation. CT did not show a clear recurrence.     The patient also has a history of bladder cancer, although the wife states it was both kidney (not sure which kidney) and bladder. They were unable to describe how it was treated, but stated it was with Dr. Flowers. Per [facility], there was no clear mention of kidney cancer. The patient underwent transurethral resection of the bladder tumor in early 2017.     The patient and his wife were present for the appointment. They were able to provide the majority of the patient's history with prompting.     Past Medical History  He has a past medical history of GERD (gastroesophageal reflux disease) and Hypertension.    Surgical History  He has a past surgical history that includes Other surgical history (08/26/2019); Other surgical history (08/26/2019); Other surgical history (08/26/2019); IR injection joint (07/28/2020); IR injection joint (03/25/2021); IR injection joint (08/12/2021); IR injection joint (02/11/2022); IR injection joint (09/08/2022); IR injection joint (03/09/2023); Bladder tumor excision; and Partial hip arthroplasty (Right).  Right inguinal hernia repair with mesh 1996, Dr. Reese (not able to find this record on [facility])  Laparoscopic cholecystectomy 5/9/11, Dr. Reese  Right inguinal exploration, mesh removal, neurolysis, repair with plug and patch 9/9/10, Dr. Reese    Medications  Medications Ordered Prior to Encounter[1]    Allergies  Benicar [olmesartan], Omeprazole, Pepto-bismol [bismuth  "subsalicylate], and Penicillins     Social History  He reports that he has never smoked. He has never used smokeless tobacco. He reports that he does not currently use alcohol. He reports that he does not use drugs.    Family History  Family History[2]     Review of Systems   Constitutional:  Negative for chills and fever.   Respiratory:  Negative for cough and shortness of breath.    Cardiovascular:  Negative for chest pain and palpitations.   Gastrointestinal:  Positive for abdominal pain.   Neurological:  Negative for dizziness and headaches.   All other systems reviewed and are negative.      Last Recorded Vitals  Blood pressure 134/85, pulse 97, height 1.727 m (5' 8\"), weight 90.1 kg (198 lb 9.6 oz), SpO2 93%.     Physical Exam  Constitutional:       General: He is not in acute distress.     Appearance: Normal appearance. He is not ill-appearing.      Comments: Smells of cigarette smoke. Upon questioning, the patient and wife do not smoke themselves, but live with smokers.   HENT:      Head: Normocephalic and atraumatic.   Cardiovascular:      Rate and Rhythm: Normal rate and regular rhythm.   Pulmonary:      Effort: Pulmonary effort is normal. No respiratory distress.      Breath sounds: Normal breath sounds.   Abdominal:      General: There is no distension.      Palpations: Abdomen is soft.      Tenderness: There is no abdominal tenderness. There is no guarding.      Hernia: No hernia is present.      Comments: No significant inguinal hernias bilaterally. Mild tenderness to palpation of right groin. Well healed right inguinal scar   Musculoskeletal:         General: No swelling.   Skin:     General: Skin is warm and dry.   Neurological:      Mental Status: He is alert and oriented to person, place, and time. Mental status is at baseline.   Psychiatric:         Mood and Affect: Mood normal.         Behavior: Behavior normal.        Relevant Results    CT pelvis 5/12/23  ABDOMINAL WALL, SOFT " TISSUES:  Postsurgical changes relating to right inguinal hernia repair. There  is a small fat containing left inguinal hernia.   Impression   No CT evidence of acute process within the pelvis. Incidental  findings as above.       Assessment and Plan  74 y.o. male with right inguinal pain that upon review of the medical record, likely goes back a few years (a bit longer than he stated), as he had a negative workup 2 years ago. I discussed that the pain could be due to scarring and nerve injury/stress, or due to hernia recurrence. However, I was not able to palpate a right inguinal hernia. I recommend undergoing CT pelvis to reevaluate his right inguinal region. If there is no inguinal hernia, no surgical intervention will be recommended. If there is an inguinal hernia, I may refer him to a hernia specialist to discuss options. In either case, I will refer him to Pain Management to discuss options for managing the chronic pain in the right groin.    The patient expressed frustration in scheduling a Urology appointment for a surveillance cystoscopy, with his history of bladder cancer. He was told the current urologist here does not perform cystoscopies in the hospital, which I corrected. I discussed he could go to Dr. Bourne, who he saw in the past and is now at [facility], but he is unwilling to travel that far. I will place a Urology referral regardless.    The patient will receive a phone call with the CT results. He was frustrated with the lack of definitive answers and left the office before the CT was scheduled and the referrals placed - this can be arranged over the phone. Follow up on an as needed basis.     Marcela Llanes MD, [facility]  General Surgery        [1]   Current Outpatient Medications on File Prior to Visit   Medication Sig Dispense Refill    cetirizine (ZyrTEC) 10 mg tablet Take by mouth once daily.      cholecalciferol (Vitamin D-3) 125 MCG (5000 UT) capsule Take by mouth once daily.       cholestyramine (Questran) 4 gram packet Take 1 packet (4 g) by mouth 2 times a day. 180 packet 1    docosahexaenoic acid/epa (FISH OIL ORAL) Take by mouth once daily.      fluticasone (Flonase) 50 mcg/actuation nasal spray Administer 2 sprays into affected nostril(s) once daily.      L. gasseri-B. bifidum-B longum (Probiotic Colon Care) 1.5 billion cell capsule Take by mouth once daily.      lisinopril 20 mg tablet Take 1 tablet (20 mg) by mouth once daily. 90 tablet 1    mesalamine (Delzicol) 400 mg DR capsule Take 2 capsules (800 mg) by mouth 2 times a day. 360 capsule 1    tamsulosin (Flomax) 0.4 mg 24 hr capsule Take 2 capsules (0.8 mg) by mouth once daily. 180 capsule 3    pantoprazole (ProtoNix) 40 mg EC tablet TAKE 1 TABLET BY MOUTH ONCE DAILY (Patient not taking: Reported on 1/30/2025) 30 tablet 0     No current facility-administered medications on file prior to visit.   [2]   Family History  Problem Relation Name Age of Onset    Throat cancer Mother      Hypertension Father      Other (bladder cancer) Father      Breast cancer Neg Hx

## 2025-05-12 ENCOUNTER — APPOINTMENT (OUTPATIENT)
Dept: UROLOGY | Facility: CLINIC | Age: 75
End: 2025-05-12
Payer: MEDICARE

## 2025-05-12 DIAGNOSIS — Z85.51 HISTORY OF BLADDER CANCER: ICD-10-CM

## 2025-05-12 DIAGNOSIS — N40.0 BENIGN PROSTATIC HYPERPLASIA, UNSPECIFIED WHETHER LOWER URINARY TRACT SYMPTOMS PRESENT: ICD-10-CM

## 2025-05-12 LAB
POC BILIRUBIN, URINE: NEGATIVE
POC BLOOD, URINE: NEGATIVE
POC GLUCOSE, URINE: NEGATIVE MG/DL
POC KETONES, URINE: NEGATIVE MG/DL
POC LEUKOCYTES, URINE: NEGATIVE
POC NITRITE,URINE: NEGATIVE
POC PH, URINE: 5.5 PH
POC PROTEIN, URINE: ABNORMAL MG/DL
POC SPECIFIC GRAVITY, URINE: 1.02
POC UROBILINOGEN, URINE: 0.2 EU/DL

## 2025-05-12 PROCEDURE — 99214 OFFICE O/P EST MOD 30 MIN: CPT | Performed by: UROLOGY

## 2025-05-12 PROCEDURE — 1159F MED LIST DOCD IN RCRD: CPT | Performed by: UROLOGY

## 2025-05-12 PROCEDURE — 81003 URINALYSIS AUTO W/O SCOPE: CPT | Performed by: UROLOGY

## 2025-05-12 PROCEDURE — 1036F TOBACCO NON-USER: CPT | Performed by: UROLOGY

## 2025-05-12 NOTE — PROGRESS NOTES
05/12/2025  Voiding well, no blood in urine    PSA  1/23/2025 3.76  1/30/2024 3.95    We discussed the history of bladder cancer surveillance cystoscopy in the OR  All the questions were answered, the patient expressed understanding and agreed to the plan.    Impression  History of bladder cancer  BPH     Plan  Cystoscopy in OR under local-or mac      Chief Complaint   Patient presents with    Bladder Cancer     Pt is here today for a follow up. He states that he has groin pain often. He saw a hernia dr that told him he had multiple hernias.        Physical Exam     TODAYS LAB RESULTS:  Lab Results   Component Value Date    PSA 3.76 01/23/2025    PSA 3.95 01/30/2024    PSA 2.92 01/24/2023     ontains abnormal data POCT UA Automated manually resulted  Order: [id number]   Status: Final result       Next appt: 05/21/2025 at 02:45 PM in Radiology (POR CT 1)       Dx: Benign prostatic hyperplasia, unspeci...    Test Result Released: No (inaccessible in [facility])    0 Result Notes       Component  Ref Range & Units 14:07 5 mo ago   POC Glucose, Urine  NEGATIVE mg/dl NEGATIVE NEGATIVE   POC Bilirubin, Urine  NEGATIVE NEGATIVE NEGATIVE   POC Ketones, Urine  NEGATIVE mg/dl NEGATIVE NEGATIVE   POC Specific Gravity, Urine  1.005 - 1.035 1.020 1.025   POC Blood, Urine  NEGATIVE NEGATIVE NEGATIVE   POC PH, Urine  No Reference Range Established PH 5.5 5.5   POC Protein, Urine  NEGATIVE mg/dl TRACE Abnormal  NEGATIVE R   POC Urobilinogen, Urine  0.2, 1.0 EU/DL 0.2 0.2   Poc Nitrite, Urine  NEGATIVE NEGATIVE NEGATIVE   POC Leukocytes, Urine  NEGATIVE NEGATIVE NEGATIVE             Specimen Collected: 05/12/25 14:07 Last Resulted: 05/12/25 14:07           ASSESSMENT&PLAN:      IMPRESSIONS:         12/02/2024  History of bladder cancer, low-grade 7 years ago     We discussed the yearly cystoscopy versus close watch clinically  All the questions were answered, the patient expressed understanding and agreed to the plan.      Impression  History of bladder cancer  BPH     Plan  Cystoscopy in OR under local-declined  Yearly follow-up  Call if blood in urine          Chief Complaint   Patient presents with    Follow-up       Pt here for a follow up no blood no burning no leakage   Pt had a Cystoscopy on 10/05/2023       Pt said he needs med refill  Physical Exam      TODAYS LAB RESULTS:     OCT UA Automated manually resulted  Order: [id number]   Status: Final result       Visible to patient: No (inaccessible in [facility])       Next appt: 01/30/2025 at 02:00 PM in Primary Care (Josafat Barillas MD)       Dx: Benign prostatic hyperplasia with low...    0 Result Notes        Component  Ref Range & Units 14:08   POC Color, Urine  Straw, Yellow, Light-Yellow Yellow   POC Appearance, Urine  Clear Clear   POC Glucose, Urine  NEGATIVE mg/dl NEGATIVE   POC Bilirubin, Urine  NEGATIVE NEGATIVE   POC Ketones, Urine  NEGATIVE mg/dl NEGATIVE   POC Specific Gravity, Urine  1.005 - 1.035 1.025   POC Blood, Urine  NEGATIVE NEGATIVE   POC PH, Urine  No Reference Range Established PH 5.5   POC Protein, Urine  NEGATIVE, 30 (1+) mg/dl NEGATIVE   POC Urobilinogen, Urine  0.2, 1.0 EU/DL 0.2   Poc Nitrite, Urine  NEGATIVE NEGATIVE   POC Leukocytes, Urine  NEGATIVE NEGATIVE         ASSESSMENT&PLAN:        IMPRESSIONS:      08/26/2019  Voiding well, only complains his arthritis     Patient has no nausea, no vomiting, no fever.     PSA normal     OZZIE deferred     We discussed benign prostate hypertrophy, continue Flomax 0.4 mg daily  We discussed the cessation of PSA check and cystoscopy  All the questions were answered, the patient expressed understanding and agreed to the plan.     Impression  BPH  Dysuria  remote history of bladder cancer     Plan  Continue Flomax 0.4 mg daily  Appointment in 1 year        8/14/18   [facility] Patient is doing well and voiding without difficulty using tamsulosin 0.4 mg 1 tablet daily he denies any side effects from the medication he  states he has a good urinary stream empties his bladder well and nocturia is 1-2 he denies any blood burning or urinary incontinence.        PSA  1/23/2025 3.76  1/30/2024 3.95  1/24/2023 2.92  8/19/19 -2.82        Surgery  10/5/2023 OR cystoscopy Dr. Bourne  2017 [facility] low-grade papillary urothelial carcinoma low-grade

## 2025-05-12 NOTE — H&P (VIEW-ONLY)
05/12/2025  Voiding well, no blood in urine    PSA  1/23/2025 3.76  1/30/2024 3.95    We discussed the history of bladder cancer surveillance cystoscopy in the OR  All the questions were answered, the patient expressed understanding and agreed to the plan.    Impression  History of bladder cancer  BPH     Plan  Cystoscopy in OR under local-or mac      Chief Complaint   Patient presents with    Bladder Cancer     Pt is here today for a follow up. He states that he has groin pain often. He saw a hernia dr that told him he had multiple hernias.        Physical Exam     TODAYS LAB RESULTS:  Lab Results   Component Value Date    PSA 3.76 01/23/2025    PSA 3.95 01/30/2024    PSA 2.92 01/24/2023     ontains abnormal data POCT UA Automated manually resulted  Order: [id number]   Status: Final result       Next appt: 05/21/2025 at 02:45 PM in Radiology (POR CT 1)       Dx: Benign prostatic hyperplasia, unspeci...    Test Result Released: No (inaccessible in [facility])    0 Result Notes       Component  Ref Range & Units 14:07 5 mo ago   POC Glucose, Urine  NEGATIVE mg/dl NEGATIVE NEGATIVE   POC Bilirubin, Urine  NEGATIVE NEGATIVE NEGATIVE   POC Ketones, Urine  NEGATIVE mg/dl NEGATIVE NEGATIVE   POC Specific Gravity, Urine  1.005 - 1.035 1.020 1.025   POC Blood, Urine  NEGATIVE NEGATIVE NEGATIVE   POC PH, Urine  No Reference Range Established PH 5.5 5.5   POC Protein, Urine  NEGATIVE mg/dl TRACE Abnormal  NEGATIVE R   POC Urobilinogen, Urine  0.2, 1.0 EU/DL 0.2 0.2   Poc Nitrite, Urine  NEGATIVE NEGATIVE NEGATIVE   POC Leukocytes, Urine  NEGATIVE NEGATIVE NEGATIVE             Specimen Collected: 05/12/25 14:07 Last Resulted: 05/12/25 14:07           ASSESSMENT&PLAN:      IMPRESSIONS:         12/02/2024  History of bladder cancer, low-grade 7 years ago     We discussed the yearly cystoscopy versus close watch clinically  All the questions were answered, the patient expressed understanding and agreed to the plan.      Impression  History of bladder cancer  BPH     Plan  Cystoscopy in OR under local-declined  Yearly follow-up  Call if blood in urine          Chief Complaint   Patient presents with    Follow-up       Pt here for a follow up no blood no burning no leakage   Pt had a Cystoscopy on 10/05/2023       Pt said he needs med refill  Physical Exam      TODAYS LAB RESULTS:     OCT UA Automated manually resulted  Order: [id number]   Status: Final result       Visible to patient: No (inaccessible in [facility])       Next appt: 01/30/2025 at 02:00 PM in Primary Care (Josafat Barillas MD)       Dx: Benign prostatic hyperplasia with low...    0 Result Notes        Component  Ref Range & Units 14:08   POC Color, Urine  Straw, Yellow, Light-Yellow Yellow   POC Appearance, Urine  Clear Clear   POC Glucose, Urine  NEGATIVE mg/dl NEGATIVE   POC Bilirubin, Urine  NEGATIVE NEGATIVE   POC Ketones, Urine  NEGATIVE mg/dl NEGATIVE   POC Specific Gravity, Urine  1.005 - 1.035 1.025   POC Blood, Urine  NEGATIVE NEGATIVE   POC PH, Urine  No Reference Range Established PH 5.5   POC Protein, Urine  NEGATIVE, 30 (1+) mg/dl NEGATIVE   POC Urobilinogen, Urine  0.2, 1.0 EU/DL 0.2   Poc Nitrite, Urine  NEGATIVE NEGATIVE   POC Leukocytes, Urine  NEGATIVE NEGATIVE         ASSESSMENT&PLAN:        IMPRESSIONS:      08/26/2019  Voiding well, only complains his arthritis     Patient has no nausea, no vomiting, no fever.     PSA normal     OZZIE deferred     We discussed benign prostate hypertrophy, continue Flomax 0.4 mg daily  We discussed the cessation of PSA check and cystoscopy  All the questions were answered, the patient expressed understanding and agreed to the plan.     Impression  BPH  Dysuria  remote history of bladder cancer     Plan  Continue Flomax 0.4 mg daily  Appointment in 1 year        8/14/18   [facility] Patient is doing well and voiding without difficulty using tamsulosin 0.4 mg 1 tablet daily he denies any side effects from the medication he  states he has a good urinary stream empties his bladder well and nocturia is 1-2 he denies any blood burning or urinary incontinence.        PSA  1/23/2025 3.76  1/30/2024 3.95  1/24/2023 2.92  8/19/19 -2.82        Surgery  10/5/2023 OR cystoscopy Dr. Bourne  2017 [facility] low-grade papillary urothelial carcinoma low-grade

## 2025-05-21 ENCOUNTER — HOSPITAL ENCOUNTER (OUTPATIENT)
Dept: RADIOLOGY | Facility: HOSPITAL | Age: 75
Discharge: HOME | End: 2025-05-21
Payer: MEDICARE

## 2025-05-21 DIAGNOSIS — R10.31 RIGHT INGUINAL PAIN: ICD-10-CM

## 2025-05-21 PROCEDURE — 72192 CT PELVIS W/O DYE: CPT

## 2025-06-03 ENCOUNTER — HOSPITAL ENCOUNTER (OUTPATIENT)
Facility: HOSPITAL | Age: 75
Setting detail: OUTPATIENT SURGERY
Discharge: HOME | End: 2025-06-03
Attending: UROLOGY | Admitting: UROLOGY
Payer: MEDICARE

## 2025-06-03 VITALS
HEIGHT: 68 IN | OXYGEN SATURATION: 95 % | TEMPERATURE: 97 F | DIASTOLIC BLOOD PRESSURE: 99 MMHG | HEART RATE: 96 BPM | RESPIRATION RATE: 17 BRPM | SYSTOLIC BLOOD PRESSURE: 158 MMHG | BODY MASS INDEX: 30.01 KG/M2 | WEIGHT: 198 LBS

## 2025-06-03 DIAGNOSIS — Z85.51 HISTORY OF BLADDER CANCER: Primary | ICD-10-CM

## 2025-06-03 PROCEDURE — 3600000008 HC OR TIME - EACH INCREMENTAL 1 MINUTE - PROCEDURE LEVEL THREE: Performed by: UROLOGY

## 2025-06-03 PROCEDURE — 3600000003 HC OR TIME - INITIAL BASE CHARGE - PROCEDURE LEVEL THREE: Performed by: UROLOGY

## 2025-06-03 PROCEDURE — 7100000009 HC PHASE TWO TIME - INITIAL BASE CHARGE: Performed by: UROLOGY

## 2025-06-03 PROCEDURE — 52000 CYSTOURETHROSCOPY: CPT | Performed by: UROLOGY

## 2025-06-03 PROCEDURE — C1748 ENDOSCOPE, SINGLE, UGI: HCPCS | Performed by: UROLOGY

## 2025-06-03 PROCEDURE — 7100000010 HC PHASE TWO TIME - EACH INCREMENTAL 1 MINUTE: Performed by: UROLOGY

## 2025-06-03 PROCEDURE — 2500000005 HC RX 250 GENERAL PHARMACY W/O HCPCS: Performed by: UROLOGY

## 2025-06-03 PROCEDURE — 2720000007 HC OR 272 NO HCPCS: Performed by: UROLOGY

## 2025-06-03 RX ORDER — METOCLOPRAMIDE HYDROCHLORIDE 5 MG/ML
10 INJECTION INTRAMUSCULAR; INTRAVENOUS ONCE
Status: DISCONTINUED | OUTPATIENT
Start: 2025-06-03 | End: 2025-06-03

## 2025-06-03 RX ORDER — ONDANSETRON HYDROCHLORIDE 2 MG/ML
4 INJECTION, SOLUTION INTRAVENOUS ONCE AS NEEDED
Status: CANCELLED | OUTPATIENT
Start: 2025-06-03

## 2025-06-03 RX ORDER — ALBUTEROL SULFATE 0.83 MG/ML
2.5 SOLUTION RESPIRATORY (INHALATION) ONCE
Status: DISCONTINUED | OUTPATIENT
Start: 2025-06-03 | End: 2025-06-03

## 2025-06-03 RX ORDER — CLINDAMYCIN PHOSPHATE 600 MG/50ML
600 INJECTION, SOLUTION INTRAVENOUS ONCE
Status: DISCONTINUED | OUTPATIENT
Start: 2025-06-03 | End: 2025-06-03 | Stop reason: HOSPADM

## 2025-06-03 RX ORDER — SODIUM CITRATE AND CITRIC ACID MONOHYDRATE 334; 500 MG/5ML; MG/5ML
30 SOLUTION ORAL ONCE
Status: DISCONTINUED | OUTPATIENT
Start: 2025-06-03 | End: 2025-06-03

## 2025-06-03 RX ORDER — FAMOTIDINE 10 MG/ML
20 INJECTION, SOLUTION INTRAVENOUS ONCE
Status: DISCONTINUED | OUTPATIENT
Start: 2025-06-03 | End: 2025-06-03

## 2025-06-03 RX ORDER — MORPHINE SULFATE 4 MG/ML
4 INJECTION INTRAVENOUS EVERY 5 MIN PRN
Status: CANCELLED | OUTPATIENT
Start: 2025-06-03

## 2025-06-03 RX ORDER — LIDOCAINE HYDROCHLORIDE 20 MG/ML
JELLY TOPICAL AS NEEDED
Status: DISCONTINUED | OUTPATIENT
Start: 2025-06-03 | End: 2025-06-03 | Stop reason: HOSPADM

## 2025-06-03 RX ORDER — MORPHINE SULFATE 2 MG/ML
2 INJECTION, SOLUTION INTRAMUSCULAR; INTRAVENOUS EVERY 5 MIN PRN
Status: CANCELLED | OUTPATIENT
Start: 2025-06-03

## 2025-06-03 RX ORDER — ONDANSETRON HYDROCHLORIDE 2 MG/ML
4 INJECTION, SOLUTION INTRAVENOUS ONCE
Status: DISCONTINUED | OUTPATIENT
Start: 2025-06-03 | End: 2025-06-03

## 2025-06-03 RX ORDER — SODIUM CHLORIDE 9 MG/ML
125 INJECTION, SOLUTION INTRAVENOUS CONTINUOUS
Status: DISCONTINUED | OUTPATIENT
Start: 2025-06-03 | End: 2025-06-03

## 2025-06-03 RX ORDER — WATER 1 ML/ML
INJECTION IRRIGATION AS NEEDED
Status: DISCONTINUED | OUTPATIENT
Start: 2025-06-03 | End: 2025-06-03 | Stop reason: HOSPADM

## 2025-06-03 ASSESSMENT — COLUMBIA-SUICIDE SEVERITY RATING SCALE - C-SSRS
6. HAVE YOU EVER DONE ANYTHING, STARTED TO DO ANYTHING, OR PREPARED TO DO ANYTHING TO END YOUR LIFE?: NO
2. HAVE YOU ACTUALLY HAD ANY THOUGHTS OF KILLING YOURSELF?: NO
1. IN THE PAST MONTH, HAVE YOU WISHED YOU WERE DEAD OR WISHED YOU COULD GO TO SLEEP AND NOT WAKE UP?: NO

## 2025-06-03 ASSESSMENT — PAIN - FUNCTIONAL ASSESSMENT
PAIN_FUNCTIONAL_ASSESSMENT: 0-10

## 2025-06-03 ASSESSMENT — PAIN SCALES - GENERAL
PAINLEVEL_OUTOF10: 3
PAINLEVEL_OUTOF10: 0 - NO PAIN
PAINLEVEL_OUTOF10: 0 - NO PAIN

## 2025-06-03 ASSESSMENT — PAIN DESCRIPTION - DESCRIPTORS: DESCRIPTORS: ACHING

## 2025-06-03 NOTE — OP NOTE
CYSTOSCOPY (LOCAL ONLY) Operative Note     Date: 6/3/2025  OR Location: [facility] OR    Name: Jaime Mcnair, : 1950, Age: 74 y.o., MRN: [id number], Sex: male    Diagnosis  Pre-op Diagnosis      * History of bladder cancer [Z85.51] Post-op Diagnosis     * History of bladder cancer [Z85.51]     Procedures  CYSTOSCOPY (LOCAL ONLY)  52699 - [place] CYSTOURETHROSCOPY    CYSTOSCOPY (LOCAL ONLY)  13130 - [place] CYSTOURETHROSCOPY      Surgeons      * Sky Orellana - Primary    Resident/Fellow/Other Assistant:  Surgeons and Role:  * No surgeons found with a matching role *    Staff:   Yudelkaulator: Trinh  Circulator: Adamaris Rosa Person: Leo  Circulator: Axel    Anesthesia Staff: ALEX: MADI Wood    Procedure Summary  Anesthesia: Anesthesia type not filed in the log.  ASA: ASA status not filed in the log.  Estimated Blood Loss: 0mL  Intra-op Medications: Administrations occurring from 1209 to 1309 on 25:  * No intraprocedure medications in log *        Specimen: No specimens collected              Drains and/or Catheters: * None in log *    Tourniquet Times:         Implants:     Findings: No tumor recurrence    Indications: Jaime Mcnair is an 74 y.o. male who is having surgery for History of bladder cancer [Z85.51].  Bladder cancer history    The patient was seen in the preoperative area. The risks, benefits, complications, treatment options, non-operative alternatives, expected recovery and outcomes were discussed with the patient. The possibilities of reaction to medication, pulmonary aspiration, injury to surrounding structures, bleeding, recurrent infection, the need for additional procedures, failure to diagnose a condition, and creating a complication requiring transfusion or operation were discussed with the patient. The patient concurred with the proposed plan, giving informed consent.  The site of surgery was properly noted/marked if necessary per policy. The patient has been actively warmed in  preoperative area. Preoperative antibiotics have been ordered and given within 1 hours of incision. Venous thrombosis prophylaxis have been ordered including bilateral sequential compression devices    Procedure Details: Patient was identified in the preoperative holding area and brought into the room, placed on supine position. After local lidocaine was induced, patient was repositioned in a dorsal lithotomy position, genitalia area was prepped and draped in a routine standard fashion. A cystoscopy was performed with a flexible cystoscope, and the findings include normal urethra, normal bladder, no tumor no stone.  Bladder was emptied and scope removed. Patient returned to PACU in a stable condition.  Evidence of Infection: No   Complications:  None; patient tolerated the procedure well.    Disposition: PACU - hemodynamically stable.  Condition: stable                 Plan  Will schedule a OR cystoscopy in a year    Attending Attestation: I performed the procedure.    Sky Orellana  Phone Number: [phone]

## 2025-06-09 ENCOUNTER — APPOINTMENT (OUTPATIENT)
Dept: PAIN MEDICINE | Facility: HOSPITAL | Age: 75
End: 2025-06-09
Payer: MEDICARE

## 2025-06-12 ENCOUNTER — LAB (OUTPATIENT)
Dept: LAB | Facility: HOSPITAL | Age: 75
End: 2025-06-12
Payer: MEDICARE

## 2025-06-12 DIAGNOSIS — D75.1 SECONDARY POLYCYTHEMIA: Primary | ICD-10-CM

## 2025-06-12 DIAGNOSIS — D75.1 POLYCYTHEMIA: ICD-10-CM

## 2025-06-12 LAB
ALBUMIN SERPL BCP-MCNC: 3.9 G/DL (ref 3.4–5)
ALP SERPL-CCNC: 104 U/L (ref 33–136)
ALT SERPL W P-5'-P-CCNC: 14 U/L (ref 10–52)
ANION GAP SERPL CALC-SCNC: 10 MMOL/L (ref 10–20)
AST SERPL W P-5'-P-CCNC: 15 U/L (ref 9–39)
BASOPHILS # BLD AUTO: 0.07 X10*3/UL (ref 0–0.1)
BASOPHILS NFR BLD AUTO: 0.9 %
BILIRUB SERPL-MCNC: 0.4 MG/DL (ref 0–1.2)
BUN SERPL-MCNC: 24 MG/DL (ref 6–23)
CALCIUM SERPL-MCNC: 8.9 MG/DL (ref 8.6–10.3)
CHLORIDE SERPL-SCNC: 104 MMOL/L (ref 98–107)
CO2 SERPL-SCNC: 27 MMOL/L (ref 21–32)
CREAT SERPL-MCNC: 1.2 MG/DL (ref 0.5–1.3)
EGFRCR SERPLBLD CKD-EPI 2021: 63 ML/MIN/1.73M*2
EOSINOPHIL # BLD AUTO: 0.35 X10*3/UL (ref 0–0.4)
EOSINOPHIL NFR BLD AUTO: 4.3 %
ERYTHROCYTE [DISTWIDTH] IN BLOOD BY AUTOMATED COUNT: 16.7 % (ref 11.5–14.5)
FERRITIN SERPL-MCNC: 251 NG/ML (ref 20–300)
GLUCOSE SERPL-MCNC: 112 MG/DL (ref 74–99)
HCT VFR BLD AUTO: 53.9 % (ref 41–52)
HGB BLD-MCNC: 17.4 G/DL (ref 13.5–17.5)
IMM GRANULOCYTES # BLD AUTO: 0.03 X10*3/UL (ref 0–0.5)
IMM GRANULOCYTES NFR BLD AUTO: 0.4 % (ref 0–0.9)
LYMPHOCYTES # BLD AUTO: 1.79 X10*3/UL (ref 0.8–3)
LYMPHOCYTES NFR BLD AUTO: 22.2 %
MCH RBC QN AUTO: 26.4 PG (ref 26–34)
MCHC RBC AUTO-ENTMCNC: 32.3 G/DL (ref 32–36)
MCV RBC AUTO: 82 FL (ref 80–100)
MONOCYTES # BLD AUTO: 0.6 X10*3/UL (ref 0.05–0.8)
MONOCYTES NFR BLD AUTO: 7.4 %
NEUTROPHILS # BLD AUTO: 5.22 X10*3/UL (ref 1.6–5.5)
NEUTROPHILS NFR BLD AUTO: 64.8 %
NRBC BLD-RTO: 0 /100 WBCS (ref 0–0)
PLATELET # BLD AUTO: 211 X10*3/UL (ref 150–450)
POTASSIUM SERPL-SCNC: 4.2 MMOL/L (ref 3.5–5.3)
PROT SERPL-MCNC: 6.3 G/DL (ref 6.4–8.2)
RBC # BLD AUTO: 6.6 X10*6/UL (ref 4.5–5.9)
SODIUM SERPL-SCNC: 137 MMOL/L (ref 136–145)
WBC # BLD AUTO: 8.1 X10*3/UL (ref 4.4–11.3)

## 2025-06-12 PROCEDURE — 36415 COLL VENOUS BLD VENIPUNCTURE: CPT

## 2025-06-12 PROCEDURE — 85025 COMPLETE CBC W/AUTO DIFF WBC: CPT

## 2025-06-12 PROCEDURE — 80053 COMPREHEN METABOLIC PANEL: CPT

## 2025-06-12 PROCEDURE — 82728 ASSAY OF FERRITIN: CPT

## 2025-06-16 ENCOUNTER — OFFICE VISIT (OUTPATIENT)
Dept: HEMATOLOGY/ONCOLOGY | Facility: CLINIC | Age: 75
End: 2025-06-16
Payer: MEDICARE

## 2025-06-16 VITALS
TEMPERATURE: 97.3 F | HEIGHT: 68 IN | HEART RATE: 93 BPM | DIASTOLIC BLOOD PRESSURE: 85 MMHG | OXYGEN SATURATION: 98 % | BODY MASS INDEX: 30.31 KG/M2 | SYSTOLIC BLOOD PRESSURE: 150 MMHG | WEIGHT: 199.96 LBS | RESPIRATION RATE: 16 BRPM

## 2025-06-16 DIAGNOSIS — D75.1 POLYCYTHEMIA: ICD-10-CM

## 2025-06-16 PROCEDURE — 3079F DIAST BP 80-89 MM HG: CPT | Performed by: NURSE PRACTITIONER

## 2025-06-16 PROCEDURE — 1125F AMNT PAIN NOTED PAIN PRSNT: CPT | Performed by: NURSE PRACTITIONER

## 2025-06-16 PROCEDURE — 99214 OFFICE O/P EST MOD 30 MIN: CPT | Performed by: NURSE PRACTITIONER

## 2025-06-16 PROCEDURE — 3077F SYST BP >= 140 MM HG: CPT | Performed by: NURSE PRACTITIONER

## 2025-06-16 PROCEDURE — 1159F MED LIST DOCD IN RCRD: CPT | Performed by: NURSE PRACTITIONER

## 2025-06-16 ASSESSMENT — PAIN SCALES - GENERAL: PAINLEVEL_OUTOF10: 2

## 2025-06-16 NOTE — PROGRESS NOTES
Patient ID: Jaime Mcnair is a 74 y.o. male.  Referring Physician: Rosanne M Casal, APRN-CNP, DNP  [address] [place] [address]  Primary Care Provider: Josafat Barillas MD  Visit Type: Follow Up      Subjective    Chief Complaint: Erythrocytosis   Interval History:    1.  Erythrocytosis, JAK2 negative     Patient is a 73-year-old gentleman with a history of hypertension, hypercholesterolemia, benign prostate hypertrophy, diverticulosis, patient went for routine examination and a CBC done on November 8, 2016 did show WBC count 8.1, RBC 6.5, hemoglobin 17.7,  hematocrit 52.8, platelets 196.     Serum iron 64, serum ferritin 105,   BUN 21, serum creatinine 1.32, calcium 9.1, total bilirubin 0.6, AST 19, AST 14, alkaline phosphatase 96, TSH 2.58.  B12 level 959.     CBC was repeated on December 30, 2016 did show WBC count 9.0, RBC 6.6, hemoglobin 17.6, hematocrit 54, platelets 228.   He was evaluated for erythrocytosis.     Patient denied any headache, hot flashes, no chest pain, no shortness of breath, no abdominal pain, no nausea vomiting, no itching, no history of blood clot, no night sweats,     Serum erythropoietin level was 10.7 and a Oliver 2 mutation was not seen.     6/16/25  Patient still has erythrocytosis, questionable etiology, JAK2 negative, hematocrit 53.9%. Patient did have hip surgery 1last year and hematocrit decreased to 44.2 at that time.   No headache, no dizziness, no nasal congestion, no chest pain, no shortness of breath, no nausea vomiting, no abdominal pain, no night sweats, no joint pain. Patient has a history of SHAQUILLE and states that he is not able to use CPAP, I believe this is the etiology for his polycythemia. He has never had phlebotomy. Discussed with patient: Because polycythemia caused by cardiopulmonary disease is an appropriate response to tissue hypoxia, attempts to reduce RBC mass by phlebotomy may exacerbate tissue hypoxia. Consequently, phlebotomy is not often utilized unless  "there is extreme elevation of Hct (eg, =65 percent) or symptoms attributable to increased blood volume/hyperviscosity (eg, fatigue, headache, blurred vision, transient loss of vision, paresthesias, slow mentation). In such settings, cautious phlebotomy (eg, removal of 250 mL replaced with an equal volume of crystalloid) to reduce Hct to 55 to 60 percent may provide relief of these symptoms; however, reduction of Hct to <55 percent range is likely to exacerbate dyspnea or other hypoxic symptoms.      Patient has right hip pain due to osteoarthritis going for orthopedic evaluation. He states that nothing seems to work We did recommend he try Salon Pas.          Review of Systems   All other systems reviewed and are negative.    Objective   BSA: 2.09 meters squared  /85 (BP Location: Left arm, Patient Position: Sitting)   Pulse 93   Temp 36.3 °C (97.3 °F)   Resp 16   Ht 1.727 m (5' 7.99\")   Wt 90.7 kg (199 lb 15.3 oz)   SpO2 98%   BMI 30.41 kg/m²      has a past medical history of Bladder cancer (Multi), GERD (gastroesophageal reflux disease), Hyperlipidemia, and Hypertension.   has a past surgical history that includes Other surgical history (08/26/2019); Other surgical history (08/26/2019); Other surgical history (08/26/2019); IR injection joint (07/28/2020); IR injection joint (03/25/2021); IR injection joint (08/12/2021); IR injection joint (02/11/2022); IR injection joint (09/08/2022); IR injection joint (03/09/2023); Bladder tumor excision; and Partial hip arthroplasty (Right).  Family History[1]      Jaime Mcnair  reports that he has never smoked. He has never used smokeless tobacco.  He  reports that he does not currently use alcohol.  He  reports no history of drug use.    Physical Exam  HENT:      Head: Normocephalic.      Nose: Nose normal.      Mouth/Throat:      Mouth: Mucous membranes are moist.   Eyes:      Pupils: Pupils are equal, round, and reactive to light.   Cardiovascular:      Rate " and Rhythm: Normal rate and regular rhythm.      Pulses: Normal pulses.      Heart sounds: Normal heart sounds.   Pulmonary:      Effort: Pulmonary effort is normal.      Breath sounds: Normal breath sounds.   Abdominal:      General: Bowel sounds are normal.      Palpations: Abdomen is soft.   Musculoskeletal:         General: Normal range of motion.   Skin:     General: Skin is warm and dry.   Neurological:      General: No focal deficit present.      Mental Status: He is alert and oriented to person, place, and time.   Psychiatric:         Mood and Affect: Mood normal.         Behavior: Behavior normal.         WBC   Date/Time Value Ref Range Status   06/12/2025 12:19 PM 8.1 4.4 - 11.3 x10*3/uL Final   01/23/2025 12:15 PM 8.4 4.4 - 11.3 x10*3/uL Final   06/17/2024 01:33 PM 8.3 4.4 - 11.3 x10*3/uL Final     nRBC   Date Value Ref Range Status   06/12/2025 0.0 0.0 - 0.0 /100 WBCs Final   01/23/2025 0.0 0.0 - 0.0 /100 WBCs Final   06/17/2024 0.0 0.0 - 0.0 /100 WBCs Final     RBC   Date Value Ref Range Status   06/12/2025 6.60 (H) 4.50 - 5.90 x10*6/uL Final   01/23/2025 6.89 (H) 4.50 - 5.90 x10*6/uL Final   06/17/2024 6.49 (H) 4.50 - 5.90 x10*6/uL Final     Hemoglobin   Date Value Ref Range Status   06/12/2025 17.4 13.5 - 17.5 g/dL Final   01/23/2025 18.2 (H) 13.5 - 17.5 g/dL Final   06/17/2024 17.4 13.5 - 17.5 g/dL Final     Hematocrit   Date Value Ref Range Status   06/12/2025 53.9 (H) 41.0 - 52.0 % Final   01/23/2025 56.8 (H) 41.0 - 52.0 % Final   06/17/2024 51.7 41.0 - 52.0 % Final     MCV   Date/Time Value Ref Range Status   06/12/2025 12:19 PM 82 80 - 100 fL Final   01/23/2025 12:15 PM 82 80 - 100 fL Final   06/17/2024 01:33 PM 80 80 - 100 fL Final     MCH   Date/Time Value Ref Range Status   06/12/2025 12:19 PM 26.4 26.0 - 34.0 pg Final   01/23/2025 12:15 PM 26.4 26.0 - 34.0 pg Final   06/17/2024 01:33 PM 26.8 26.0 - 34.0 pg Final     [facility]   Date/Time Value Ref Range Status   06/12/2025 12:19 PM 32.3 32.0 -  "36.0 g/dL Final   01/23/2025 12:15 PM 32.0 32.0 - 36.0 g/dL Final   06/17/2024 01:33 PM 33.7 32.0 - 36.0 g/dL Final     RDW   Date/Time Value Ref Range Status   06/12/2025 12:19 PM 16.7 (H) 11.5 - 14.5 % Final   01/23/2025 12:15 PM 17.5 (H) 11.5 - 14.5 % Final   06/17/2024 01:33 PM 16.7 (H) 11.5 - 14.5 % Final     Platelets   Date/Time Value Ref Range Status   06/12/2025 12:19  150 - 450 x10*3/uL Final   01/23/2025 12:15  150 - 450 x10*3/uL Final   06/17/2024 01:33  150 - 450 x10*3/uL Final     No results found for: \"MPV\"  Neutrophils %   Date/Time Value Ref Range Status   06/12/2025 12:19 PM 64.8 40.0 - 80.0 % Final   01/23/2025 12:15 PM 60.4 40.0 - 80.0 % Final   06/17/2024 01:33 PM 62.4 40.0 - 80.0 % Final     Immature Granulocytes %, Automated   Date/Time Value Ref Range Status   06/12/2025 12:19 PM 0.4 0.0 - 0.9 % Final     Comment:     Immature Granulocyte Count (IG) includes promyelocytes, myelocytes and metamyelocytes but does not include bands. Percent differential counts (%) should be interpreted in the context of the absolute cell counts (cells/UL).   01/23/2025 12:15 PM 0.5 0.0 - 0.9 % Final     Comment:     Immature Granulocyte Count (IG) includes promyelocytes, myelocytes and metamyelocytes but does not include bands. Percent differential counts (%) should be interpreted in the context of the absolute cell counts (cells/UL).   06/17/2024 01:33 PM 0.5 0.0 - 0.9 % Final     Comment:     Immature Granulocyte Count (IG) includes promyelocytes, myelocytes and metamyelocytes but does not include bands. Percent differential counts (%) should be interpreted in the context of the absolute cell counts (cells/UL).     Lymphocytes %   Date/Time Value Ref Range Status   06/12/2025 12:19 PM 22.2 13.0 - 44.0 % Final   01/23/2025 12:15 PM 24.9 13.0 - 44.0 % Final   06/17/2024 01:33 PM 23.8 13.0 - 44.0 % Final     Monocytes %   Date/Time Value Ref Range Status   06/12/2025 12:19 PM 7.4 2.0 - 10.0 % " Final   01/23/2025 12:15 PM 8.7 2.0 - 10.0 % Final   06/17/2024 01:33 PM 7.9 2.0 - 10.0 % Final     Eosinophils %   Date/Time Value Ref Range Status   06/12/2025 12:19 PM 4.3 0.0 - 6.0 % Final   01/23/2025 12:15 PM 4.8 0.0 - 6.0 % Final   06/17/2024 01:33 PM 4.8 0.0 - 6.0 % Final     Basophils %   Date/Time Value Ref Range Status   06/12/2025 12:19 PM 0.9 0.0 - 2.0 % Final   01/23/2025 12:15 PM 0.7 0.0 - 2.0 % Final   06/17/2024 01:33 PM 0.6 0.0 - 2.0 % Final     Neutrophils Absolute   Date/Time Value Ref Range Status   06/12/2025 12:19 PM 5.22 1.60 - 5.50 x10*3/uL Final     Comment:     Percent differential counts (%) should be interpreted in the context of the absolute cell counts (cells/uL).   01/23/2025 12:15 PM 5.09 1.60 - 5.50 x10*3/uL Final     Comment:     Percent differential counts (%) should be interpreted in the context of the absolute cell counts (cells/uL).   06/17/2024 01:33 PM 5.15 1.60 - 5.50 x10*3/uL Final     Comment:     Percent differential counts (%) should be interpreted in the context of the absolute cell counts (cells/uL).     Immature Granulocytes Absolute, Automated   Date/Time Value Ref Range Status   06/12/2025 12:19 PM 0.03 0.00 - 0.50 x10*3/uL Final   01/23/2025 12:15 PM 0.04 0.00 - 0.50 x10*3/uL Final   06/17/2024 01:33 PM 0.04 0.00 - 0.50 x10*3/uL Final     Lymphocytes Absolute   Date/Time Value Ref Range Status   06/12/2025 12:19 PM 1.79 0.80 - 3.00 x10*3/uL Final   01/23/2025 12:15 PM 2.09 0.80 - 3.00 x10*3/uL Final   06/17/2024 01:33 PM 1.97 0.80 - 3.00 x10*3/uL Final     Monocytes Absolute   Date/Time Value Ref Range Status   06/12/2025 12:19 PM 0.60 0.05 - 0.80 x10*3/uL Final   01/23/2025 12:15 PM 0.73 0.05 - 0.80 x10*3/uL Final   06/17/2024 01:33 PM 0.65 0.05 - 0.80 x10*3/uL Final     Eosinophils Absolute   Date/Time Value Ref Range Status   06/12/2025 12:19 PM 0.35 0.00 - 0.40 x10*3/uL Final   01/23/2025 12:15 PM 0.40 0.00 - 0.40 x10*3/uL Final   06/17/2024 01:33 PM 0.40 0.00  - 0.40 x10*3/uL Final     Basophils Absolute   Date/Time Value Ref Range Status   06/12/2025 12:19 PM 0.07 0.00 - 0.10 x10*3/uL Final   01/23/2025 12:15 PM 0.06 0.00 - 0.10 x10*3/uL Final   06/17/2024 01:33 PM 0.05 0.00 - 0.10 x10*3/uL Final     Assessment/Plan    Assessment and Plan:   Assessment:    #1 erythrocytosis  - previous Adryan 2 negative. I was able to add on the MPN panel to recent labs to see if he has another mutation. Most likely secondary to SHAQUILLE. No phlebotomy at this time but we should check his labs every 6 months and plan to start phlebotomy if his hematocrit increases to over 55%. Patient understands.     #2 hypertension     #3 ex-smoker     #4 prostate hypertrophy       Patient is advised to have regular exercise.     Patient was to call if she has any complaints including headache, dizziness, chest pain, any bleeding or history of blood clots.     PLAN: Patient will be reevaluated in 6 months with CBC and schedule for follow up with Blanche Hernandez. NP. He should contact us in the interim if questions or problems arise. He should continue to follow up with his PCP for other co-morbid conditions.     I had an extensive discussion with the patient regarding the diagnosis and discussed the plan of therapy, including general considerations regarding side effects and outcomes. Pt understood and gave appropriate teach back about the plan of care. All questions were answered to the patient's satisfaction. The patient is instructed to contact us at any time if questions or problems arise. Thank you for the opportunity to participate in the care of this very pleasant patient.                 Rosanne M Casal, APRN-CNP, DNP                              [1]   Family History  Problem Relation Name Age of Onset    Throat cancer Mother      Hypertension Father      Other (bladder cancer) Father      Breast cancer Neg Hx

## 2025-06-16 NOTE — PATIENT INSTRUCTIONS
Call the office at [phone] with questions or needs.  You may also contact your nurse or doctor with non-urgent issues by sending a Se[employer] message.  Reminders  Medicine refills: call or send a Todaytickets message to request medicine refills at least 5 to 7 days before you run out.  Labs: You will need labs drawn at your follow-up doctor visit  Follow up in 6 months with Blanche BELLO

## 2025-06-20 LAB
ELECTRONICALLY SIGNED BY: NORMAL
MYELOID NGS RESULTS: NORMAL

## 2025-06-23 ENCOUNTER — APPOINTMENT (OUTPATIENT)
Dept: HEMATOLOGY/ONCOLOGY | Facility: CLINIC | Age: 75
End: 2025-06-23
Payer: MEDICARE

## 2025-06-26 ENCOUNTER — TELEPHONE (OUTPATIENT)
Facility: CLINIC | Age: 75
End: 2025-06-26
Payer: MEDICARE

## 2025-06-26 NOTE — TELEPHONE ENCOUNTER
----- Message from Marcela Llanes sent at 6/25/2025  9:05 AM EDT -----  Please let the patient know the results of the CT - he does not have a right groin hernia, but he does have a groin hernia on the left. If he still having significant right groin discomfort, I can refer him to Pain Management. He can also get a second opinion from another surgeon as well.

## 2025-07-29 ENCOUNTER — APPOINTMENT (OUTPATIENT)
Dept: PRIMARY CARE | Facility: CLINIC | Age: 75
End: 2025-07-29
Payer: MEDICARE

## 2025-07-29 VITALS
DIASTOLIC BLOOD PRESSURE: 62 MMHG | SYSTOLIC BLOOD PRESSURE: 120 MMHG | OXYGEN SATURATION: 96 % | WEIGHT: 195.8 LBS | HEART RATE: 88 BPM | BODY MASS INDEX: 29.78 KG/M2 | TEMPERATURE: 97.5 F

## 2025-07-29 DIAGNOSIS — N40.1 BENIGN PROSTATIC HYPERPLASIA WITH LOWER URINARY TRACT SYMPTOMS, SYMPTOM DETAILS UNSPECIFIED: ICD-10-CM

## 2025-07-29 DIAGNOSIS — I10 ESSENTIAL HYPERTENSION: Primary | ICD-10-CM

## 2025-07-29 DIAGNOSIS — Z13.6 SCREENING FOR CARDIOVASCULAR CONDITION: ICD-10-CM

## 2025-07-29 DIAGNOSIS — Z85.51 HISTORY OF BLADDER CANCER: ICD-10-CM

## 2025-07-29 DIAGNOSIS — D75.1 POLYCYTHEMIA: ICD-10-CM

## 2025-07-29 DIAGNOSIS — K21.9 CHRONIC GERD: ICD-10-CM

## 2025-07-29 DIAGNOSIS — K51.90 ULCERATIVE COLITIS WITHOUT COMPLICATIONS, UNSPECIFIED LOCATION (MULTI): ICD-10-CM

## 2025-07-29 PROCEDURE — 3078F DIAST BP <80 MM HG: CPT | Performed by: INTERNAL MEDICINE

## 2025-07-29 PROCEDURE — 99214 OFFICE O/P EST MOD 30 MIN: CPT | Performed by: INTERNAL MEDICINE

## 2025-07-29 PROCEDURE — G2211 COMPLEX E/M VISIT ADD ON: HCPCS | Performed by: INTERNAL MEDICINE

## 2025-07-29 PROCEDURE — 1159F MED LIST DOCD IN RCRD: CPT | Performed by: INTERNAL MEDICINE

## 2025-07-29 PROCEDURE — 3074F SYST BP LT 130 MM HG: CPT | Performed by: INTERNAL MEDICINE

## 2025-07-29 RX ORDER — CHOLESTYRAMINE 4 G/9G
1 POWDER, FOR SUSPENSION ORAL 2 TIMES DAILY
Qty: 180 PACKET | Refills: 1 | Status: SHIPPED | OUTPATIENT
Start: 2025-07-29 | End: 2026-01-25

## 2025-07-29 RX ORDER — LISINOPRIL 20 MG/1
20 TABLET ORAL DAILY
Qty: 90 TABLET | Refills: 1 | Status: SHIPPED | OUTPATIENT
Start: 2025-07-29

## 2025-07-29 RX ORDER — MESALAMINE 400 MG/1
800 CAPSULE, DELAYED RELEASE ORAL 2 TIMES DAILY
Qty: 360 CAPSULE | Refills: 1 | Status: SHIPPED | OUTPATIENT
Start: 2025-07-29

## 2025-07-29 ASSESSMENT — ENCOUNTER SYMPTOMS
ALLERGIC/IMMUNOLOGIC NEGATIVE: 1
ENDOCRINE NEGATIVE: 1
MUSCULOSKELETAL NEGATIVE: 1
NEUROLOGICAL NEGATIVE: 1
HEMATOLOGIC/LYMPHATIC NEGATIVE: 1
GASTROINTESTINAL NEGATIVE: 1
PSYCHIATRIC NEGATIVE: 1
CARDIOVASCULAR NEGATIVE: 1
RESPIRATORY NEGATIVE: 1
EYES NEGATIVE: 1
CONSTITUTIONAL NEGATIVE: 1

## 2025-07-29 ASSESSMENT — PATIENT HEALTH QUESTIONNAIRE - PHQ9
2. FEELING DOWN, DEPRESSED OR HOPELESS: NOT AT ALL
1. LITTLE INTEREST OR PLEASURE IN DOING THINGS: NOT AT ALL
SUM OF ALL RESPONSES TO PHQ9 QUESTIONS 1 AND 2: 0

## 2025-07-29 NOTE — PROGRESS NOTES
Subjective   Patient ID: Jaime Mcnair is a 75 y.o. male who presents for 6 month follow up.  Patient presents today in follow up re:   HTN, GERD and allergic rhinitis.    Patient presents in follow up regarding hypertension.  Blood pressure has been well controlled on current therapy.  No adverse effects of medication reported.  Patient denies chest pain, palpitations, shortness of breath, orthopnea, fatigue or edema.    Patient reports reflux symptoms have been well controlled with current therapy.  Does have occasional flare of Sx after eating certain food.  No dysphagia, regurgitation or other associated complaints.    Rhinitis Sx well compensated on current therapy.          Review of Systems   Constitutional: Negative.    HENT: Negative.     Eyes: Negative.    Respiratory: Negative.     Cardiovascular: Negative.    Gastrointestinal: Negative.    Endocrine: Negative.    Genitourinary: Negative.    Musculoskeletal: Negative.    Skin: Negative.    Allergic/Immunologic: Negative.    Neurological: Negative.    Hematological: Negative.    Psychiatric/Behavioral: Negative.         Objective     Blood pressure 120/62, pulse 88, temperature 36.4 °C (97.5 °F), temperature source Temporal, weight 88.8 kg (195 lb 12.8 oz), SpO2 96%.   Physical Exam  Vitals reviewed.   Constitutional:       Appearance: Normal appearance.   HENT:      Head: Normocephalic and atraumatic.      Right Ear: External ear normal.      Left Ear: External ear normal.      Nose: Nose normal.      Mouth/Throat:      Mouth: Mucous membranes are moist.      Pharynx: Oropharynx is clear.     Eyes:      Conjunctiva/sclera: Conjunctivae normal.      Pupils: Pupils are equal, round, and reactive to light.     Neck:      Vascular: No carotid bruit.     Cardiovascular:      Rate and Rhythm: Normal rate and regular rhythm.      Pulses: Normal pulses.      Heart sounds: Normal heart sounds. No murmur heard.  Pulmonary:      Effort: Pulmonary effort is normal.       Breath sounds: Normal breath sounds. No wheezing or rales.   Abdominal:      General: Abdomen is flat. There is no distension.      Palpations: Abdomen is soft.      Tenderness: There is no abdominal tenderness. There is no right CVA tenderness or left CVA tenderness.     Musculoskeletal:         General: Normal range of motion.      Cervical back: Normal range of motion and neck supple.     Skin:     General: Skin is warm and dry.     Neurological:      General: No focal deficit present.      Mental Status: He is alert and oriented to person, place, and time.     Psychiatric:         Mood and Affect: Mood normal.         Behavior: Behavior normal.         Assessment/Plan   Problem List Items Addressed This Visit       Benign prostatic hyperplasia    Relevant Orders    Prostate Specific Antigen    Chronic GERD    Essential hypertension - Primary    Relevant Medications    lisinopril 20 mg tablet    Other Relevant Orders    Comprehensive Metabolic Panel    CBC and Auto Differential    History of bladder cancer    Polycythemia    Relevant Orders    CBC and Auto Differential    Ulcerative colitis    Relevant Medications    mesalamine (Delzicol) 400 mg DR capsule    cholestyramine (Questran) 4 gram packet     Other Visit Diagnoses         Screening for cardiovascular condition        Relevant Orders    Comprehensive Metabolic Panel    Lipid Panel            BP well controlled on current therapy.  Will continue present therapy and follow.  GERD Sx well compensated on current therapy.  Will continue present therapy and follow clinically.  Rhinitis Sx well compensated on current therapy.  Will continue present therapy and follow.  UC Sx well controlled on current therapy.  Will continue.  Patient had  surveillance colonoscopy per Dr. Baez last year.  He continues to follow with Urology re:  bladder and prostate hx and Sx.  He continues to follow with Heme re:  polycythemia.      Follow up in 6 months  Lab to be drawn 1  week prior to next office visit.

## 2025-08-04 ENCOUNTER — OFFICE VISIT (OUTPATIENT)
Dept: PAIN MEDICINE | Facility: HOSPITAL | Age: 75
End: 2025-08-04
Payer: MEDICARE

## 2025-08-04 VITALS
HEART RATE: 87 BPM | BODY MASS INDEX: 29.55 KG/M2 | HEIGHT: 68 IN | SYSTOLIC BLOOD PRESSURE: 139 MMHG | DIASTOLIC BLOOD PRESSURE: 84 MMHG | WEIGHT: 195 LBS | RESPIRATION RATE: 16 BRPM | OXYGEN SATURATION: 92 %

## 2025-08-04 DIAGNOSIS — Z96.641 HISTORY OF RIGHT HIP REPLACEMENT: ICD-10-CM

## 2025-08-04 DIAGNOSIS — G89.29 CHRONIC RIGHT-SIDED LOW BACK PAIN WITH RIGHT-SIDED SCIATICA: Primary | ICD-10-CM

## 2025-08-04 DIAGNOSIS — M54.41 CHRONIC RIGHT-SIDED LOW BACK PAIN WITH RIGHT-SIDED SCIATICA: Primary | ICD-10-CM

## 2025-08-04 DIAGNOSIS — R10.31 RIGHT LOWER QUADRANT ABDOMINAL PAIN: ICD-10-CM

## 2025-08-04 PROCEDURE — 99204 OFFICE O/P NEW MOD 45 MIN: CPT | Performed by: PHYSICAL MEDICINE & REHABILITATION

## 2025-08-04 PROCEDURE — 3079F DIAST BP 80-89 MM HG: CPT | Performed by: PHYSICAL MEDICINE & REHABILITATION

## 2025-08-04 PROCEDURE — 99214 OFFICE O/P EST MOD 30 MIN: CPT | Performed by: PHYSICAL MEDICINE & REHABILITATION

## 2025-08-04 PROCEDURE — 3075F SYST BP GE 130 - 139MM HG: CPT | Performed by: PHYSICAL MEDICINE & REHABILITATION

## 2025-08-04 PROCEDURE — 1159F MED LIST DOCD IN RCRD: CPT | Performed by: PHYSICAL MEDICINE & REHABILITATION

## 2025-08-04 PROCEDURE — G2211 COMPLEX E/M VISIT ADD ON: HCPCS | Performed by: PHYSICAL MEDICINE & REHABILITATION

## 2025-08-04 RX ORDER — DULOXETIN HYDROCHLORIDE 30 MG/1
30 CAPSULE, DELAYED RELEASE ORAL DAILY
Qty: 30 CAPSULE | Refills: 2 | Status: SHIPPED | OUTPATIENT
Start: 2025-08-04

## 2025-08-04 RX ORDER — DULOXETIN HYDROCHLORIDE 30 MG/1
30 CAPSULE, DELAYED RELEASE ORAL DAILY
Qty: 30 CAPSULE | Refills: 2 | Status: SHIPPED | OUTPATIENT
Start: 2025-08-04 | End: 2025-08-04

## 2025-08-04 SDOH — SOCIAL STABILITY: SOCIAL NETWORK: SOCIAL ACTIVITY:: 10

## 2025-08-04 ASSESSMENT — ENCOUNTER SYMPTOMS
LOSS OF SENSATION IN FEET: 0
DEPRESSION: 0
OCCASIONAL FEELINGS OF UNSTEADINESS: 0

## 2025-08-04 NOTE — PROGRESS NOTES
Subjective   Patient ID: Jaime Mcnair is a 75 y.o. male who presents for Back Pain and Hip Pain.  Jaime Mcnair is a 75-year-old male presenting for evaluation of right leg pain.  States that he has right hip pain radiating through right groin and pain in left groin as well.  Has a previous history of right inguinal hernia repair done twice, most recently in 2010.  Had a right hip replacement performed 2 years ago, endorses swelling in his right foot ever since.  States pain in right groin is worse with prolonged sitting.  Pain in right hip has recently radiated up to right lower back and down the lateral side of the right leg to the knee.  Endorses tingling in both lower extremities.  Endorses increased dribbling after urination recently.  Has taken Tylenol as needed at home, no other pain medications.  Has completed PT after hip surgery in 2023, did not believe it helped with hip pain.  Has not continued home exercises.      OARRS:  No data recorded  I have personally reviewed the OARRS report for Jaime Mcnair. I have considered the risks of abuse, dependence, addiction and diversion    Is the patient prescribed a combination of a benzodiazepine and opioid?  No    Last Urine Drug Screen / ordered today: No  No results found for this or any previous visit (from the past 8760 hours).  N/A    Review of Systems    ROS:   General: No fevers, chills, weight loss  Skin: Negative for lesions  Eyes: No acute vision changes  Ears: No vertigo  Nose, mouth, throat: No difficulty swallowing or speaking  Respiratory: No cough, shortness of breath, cyanosis  Cardiovascular: Negative for chest pain syncope or palpitation  Gastrointestinal: No constipation, nausea, vomiting  Neurological: Negative for headache, positive for:  Psychological: Negative for severe or debilitating anxiety, depression. Negative memory loss  Musculoskeletal: Positive for  Endocrine: Negative for weight gain, appetite changes, excessive  "sweating  Allergy/immune: Negative    All 13 systems were reviewed and are within normal levels except as noted or in the history of present illness.  Positive or pertinent negative responses are noted or were in the history of present illness. As noted, the patient denies significant or impairing weakness in the bilateral upper and lower extremities, medication induced constipation, and bowel or bladder incontinence.   Current Medications[1]     Medical History[2]     Surgical History[3]     Family History[4]     RX Allergies[5]     Objective     Visit Vitals  /84   Pulse 87   Resp 16   Ht 1.727 m (5' 7.99\")   Wt 88.5 kg (195 lb)   SpO2 92%   BMI 29.66 kg/m²   Smoking Status Never   BSA 2.06 m²        Physical Exam    PE:  General: Well-developed, well-nourished, no acute distress. The patient demonstrates no pain behavior, symptom magnification or overt drug-seeking behavior.  Eye: Pupils appropriate for room lighting  Neck/thyroid: No obvious goiter or enlargement of neck noted  Respiratory exam: Normal respiratory effort, unlabored respiration. No accessory muscle use noted  Cardiac exam: Bilateral radial pulses intact  Abdominal: Nondistended  Spine, lumbar: The patient is able to rise from a seated to standing position without hesitancy, push off, or delay. Gait is grossly nonantalgic. Tenderness to paraspinous musculature is noted  Neurologic exam: Muscle strength is antigravity in all 4 extremities.  Psychiatric exam: Judgment and insight normal, affect normal, speech is fluent, affect appropriate, demonstrating no signs of hypersomnolence, sedation, or confusion    Back (MSK/neuro/skin):  Full active and passive range of motion in all planes  Strength 5 out of 5 bilateral lower extremities  Sensation to light-touch grossly intact bilateral lower extremities  Deep tendon reflexes equal bilateral lower extremities  No edema/effusion/erythema  Skin: no skin lesions or scars  B SLR (-)  B sacral spring " test (-),   B facet load (-)  B SIJ tenderness (-)   B RAMONA (-)  Full flexion/extension  No TTP, no muscle spasm over lumbar paraspinals      Physical exam as above except:  On straight leg test, endorses pain in ipsilateral groin bilaterally, equivocal logroll.  Ambulates antalgic gait severely hunched over      Assessment/Plan   Diagnoses and all orders for this visit:  Chronic right-sided low back pain with right-sided sciatica  -     Referral to Physical Therapy; Future  -     DULoxetine (Cymbalta) 30 mg DR capsule; Take 1 capsule (30 mg) by mouth once daily. Do not crush or chew.  History of right hip replacement  -     DULoxetine (Cymbalta) 30 mg DR capsule; Take 1 capsule (30 mg) by mouth once daily. Do not crush or chew.  Right lower quadrant abdominal pain  -     DULoxetine (Cymbalta) 30 mg DR capsule; Take 1 capsule (30 mg) by mouth once daily. Do not crush or chew.    Jaime Mcnair is a 75 year old male presenting for evaluation of right leg pain.  States pain is worst in right groin, has mild pain in left groin as well.  With low back pain on right side and radicular symptoms on the right, will refer patient to physical therapy.  Will also start Cymbalta at 30 mg daily to see if this relieves any of the neuropathic pain.    Esau Landon MD  Anesthesiology PGY1      This note was generated with the aid of dictation software, there may be typos despite my attempts at proofreading.       I saw and evaluated the patient. I personally obtained the key and critical portions of the history and physical exam or was physically present for key and critical portions performed by the resident/fellow. I reviewed the resident/fellow's documentation and discussed the patient with the resident/fellow. I agree with the resident/fellow's medical decision making as documented in the note with the exception/addition of the followin-year-old male with right groin and leg pain.  Differential includes  ilioinguinal/iliohypogastric neuralgia after mesh hernia repair, no active hernia noted on CT scan.  He does have degenerative changes on his lumbar spine which also may be causing lower extremity neuritis and I think is actually much more likely at this point.  Regardless he has not had any real conservative treatment for this will need to start there.  Plan:  - Physical therapy for his lower back.  - Will start duloxetine 30 mg daily for both neuropathic and osteoarthritis pain.  - She is going to follow-up with my nurse practitioner in about 8 weeks after physical therapy and likely will order an MRI of his lumbar spine at that time.    Sky Turner MD        [1]   Current Outpatient Medications:     cetirizine (ZyrTEC) 10 mg tablet, Take by mouth once daily., Disp: , Rfl:     cholecalciferol (Vitamin D-3) 125 MCG (5000 UT) capsule, Take by mouth once daily., Disp: , Rfl:     cholestyramine (Questran) 4 gram packet, Take 1 packet (4 g) by mouth 2 times a day., Disp: 180 packet, Rfl: 1    docosahexaenoic acid/epa (FISH OIL ORAL), Take by mouth once daily., Disp: , Rfl:     fluticasone (Flonase) 50 mcg/actuation nasal spray, Administer 2 sprays into affected nostril(s) once daily., Disp: , Rfl:     L. gasseri-B. bifidum-B longum (Probiotic Colon Care) 1.5 billion cell capsule, Take by mouth once daily., Disp: , Rfl:     lisinopril 20 mg tablet, Take 1 tablet (20 mg) by mouth once daily., Disp: 90 tablet, Rfl: 1    mesalamine (Delzicol) 400 mg DR capsule, Take 2 capsules (800 mg) by mouth 2 times a day., Disp: 360 capsule, Rfl: 1    tamsulosin (Flomax) 0.4 mg 24 hr capsule, Take 2 capsules (0.8 mg) by mouth once daily., Disp: 180 capsule, Rfl: 3    DULoxetine (Cymbalta) 30 mg DR capsule, Take 1 capsule (30 mg) by mouth once daily. Do not crush or chew., Disp: 30 capsule, Rfl: 2    pantoprazole (ProtoNix) 40 mg EC tablet, TAKE 1 TABLET BY MOUTH ONCE DAILY (Patient taking differently: Take 1 tablet (40 mg) by  mouth if needed.), Disp: 30 tablet, Rfl: 0  [2]   Past Medical History:  Diagnosis Date    Bladder cancer (Multi)     GERD (gastroesophageal reflux disease)     Hyperlipidemia     Hypertension    [3]   Past Surgical History:  Procedure Laterality Date    BLADDER TUMOR EXCISION      IR INJECTION JOINT  07/28/2020    IR INJECTION JOINT 7/28/2020 [facility]    IR INJECTION JOINT  03/25/2021    IR INJECTION JOINT 3/25/2021 [facility]    IR INJECTION JOINT  08/12/2021    IR INJECTION JOINT 8/12/2021 [facility]    IR INJECTION JOINT  02/11/2022    IR INJECTION JOINT 2/11/2022 [facility]    IR INJECTION JOINT  09/08/2022    IR INJECTION JOINT 9/8/2022 [facility]    IR INJECTION JOINT  03/09/2023    IR INJECTION JOINT POR DIAGRAD    OTHER SURGICAL HISTORY  08/26/2019    Hernia repair    OTHER SURGICAL HISTORY  08/26/2019    Cataract surgery    OTHER SURGICAL HISTORY  08/26/2019    Cholecystectomy    PARTIAL HIP ARTHROPLASTY Right    [4]   Family History  Problem Relation Name Age of Onset    Throat cancer Mother      Hypertension Father      Other (bladder cancer) Father      Breast cancer Neg Hx     [5]   Allergies  Allergen Reactions    Benicar [Olmesartan] Anaphylaxis and GI bleeding    Omeprazole Nausea Only and Other    Pepto-Bismol [Bismuth Subsalicylate] Other and Unknown     Reaction from Community: Other(See Desc) Comments: vomiting    Penicillins Rash and Unknown

## 2025-08-04 NOTE — LETTER
August 4, 2025     No Recipients    Patient: Jaime Mcnair   YOB: 1950   Date of Visit: 8/4/2025       Dear Dr. Singh Recipients:    Thank you for referring Jaime Mcnair to me for evaluation. Below are my notes for this consultation.  If you have questions, please do not hesitate to call me. I look forward to following your patient along with you.       Sincerely,     Sky Turner MD      CC: No Recipients  ______________________________________________________________________________________    Subjective  Patient ID: Jaime Mcnair is a 75 y.o. male who presents for Back Pain and Hip Pain.  Jaime Mcnair is a 75-year-old male presenting for evaluation of right leg pain.  States that he has right hip pain radiating through right groin and pain in left groin as well.  Has a previous history of right inguinal hernia repair done twice, most recently in 2010.  Had a right hip replacement performed 2 years ago, endorses swelling in his right foot ever since.  States pain in right groin is worse with prolonged sitting.  Pain in right hip has recently radiated up to right lower back and down the lateral side of the right leg to the knee.  Endorses tingling in both lower extremities.  Endorses increased dribbling after urination recently.  Has taken Tylenol as needed at home, no other pain medications.  Has completed PT after hip surgery in 2023, did not believe it helped with hip pain.  Has not continued home exercises.      OARRS:  No data recorded  I have personally reviewed the OARRS report for Jaime Mcnair. I have considered the risks of abuse, dependence, addiction and diversion    Is the patient prescribed a combination of a benzodiazepine and opioid?  No    Last Urine Drug Screen / ordered today: No  No results found for this or any previous visit (from the past 8760 hours).  N/A    Review of Systems    ROS:   General: No fevers, chills, weight loss  Skin: Negative for lesions  Eyes: No acute vision  "changes  Ears: No vertigo  Nose, mouth, throat: No difficulty swallowing or speaking  Respiratory: No cough, shortness of breath, cyanosis  Cardiovascular: Negative for chest pain syncope or palpitation  Gastrointestinal: No constipation, nausea, vomiting  Neurological: Negative for headache, positive for:  Psychological: Negative for severe or debilitating anxiety, depression. Negative memory loss  Musculoskeletal: Positive for  Endocrine: Negative for weight gain, appetite changes, excessive sweating  Allergy/immune: Negative    All 13 systems were reviewed and are within normal levels except as noted or in the history of present illness.  Positive or pertinent negative responses are noted or were in the history of present illness. As noted, the patient denies significant or impairing weakness in the bilateral upper and lower extremities, medication induced constipation, and bowel or bladder incontinence.   Current Medications[1]     Medical History[2]     Surgical History[3]     Family History[4]     RX Allergies[5]     Objective    Visit Vitals  /84   Pulse 87   Resp 16   Ht 1.727 m (5' 7.99\")   Wt 88.5 kg (195 lb)   SpO2 92%   BMI 29.66 kg/m²   Smoking Status Never   BSA 2.06 m²        Physical Exam    PE:  General: Well-developed, well-nourished, no acute distress. The patient demonstrates no pain behavior, symptom magnification or overt drug-seeking behavior.  Eye: Pupils appropriate for room lighting  Neck/thyroid: No obvious goiter or enlargement of neck noted  Respiratory exam: Normal respiratory effort, unlabored respiration. No accessory muscle use noted  Cardiac exam: Bilateral radial pulses intact  Abdominal: Nondistended  Spine, lumbar: The patient is able to rise from a seated to standing position without hesitancy, push off, or delay. Gait is grossly nonantalgic. Tenderness to paraspinous musculature is noted  Neurologic exam: Muscle strength is antigravity in all 4 extremities.  Psychiatric " exam: Judgment and insight normal, affect normal, speech is fluent, affect appropriate, demonstrating no signs of hypersomnolence, sedation, or confusion    Back (MSK/neuro/skin):  Full active and passive range of motion in all planes  Strength 5 out of 5 bilateral lower extremities  Sensation to light-touch grossly intact bilateral lower extremities  Deep tendon reflexes equal bilateral lower extremities  No edema/effusion/erythema  Skin: no skin lesions or scars  B SLR (-)  B sacral spring test (-),   B facet load (-)  B SIJ tenderness (-)   B RAMONA (-)  Full flexion/extension  No TTP, no muscle spasm over lumbar paraspinals      Physical exam as above except:  On straight leg test, endorses pain in ipsilateral groin bilaterally, equivocal logroll.  Ambulates antalgic gait severely hunched over      Assessment/Plan  Diagnoses and all orders for this visit:  Chronic right-sided low back pain with right-sided sciatica  -     Referral to Physical Therapy; Future  -     DULoxetine (Cymbalta) 30 mg DR capsule; Take 1 capsule (30 mg) by mouth once daily. Do not crush or chew.  History of right hip replacement  -     DULoxetine (Cymbalta) 30 mg DR capsule; Take 1 capsule (30 mg) by mouth once daily. Do not crush or chew.  Right lower quadrant abdominal pain  -     DULoxetine (Cymbalta) 30 mg DR capsule; Take 1 capsule (30 mg) by mouth once daily. Do not crush or chew.    Jaime Mcnair is a 75 year old male presenting for evaluation of right leg pain.  States pain is worst in right groin, has mild pain in left groin as well.  With low back pain on right side and radicular symptoms on the right, will refer patient to physical therapy.  Will also start Cymbalta at 30 mg daily to see if this relieves any of the neuropathic pain.    Esau Landon MD  Anesthesiology PGY1      This note was generated with the aid of dictation software, there may be typos despite my attempts at proofreading.       I saw and evaluated the  patient. I personally obtained the key and critical portions of the history and physical exam or was physically present for key and critical portions performed by the resident/fellow. I reviewed the resident/fellow's documentation and discussed the patient with the resident/fellow. I agree with the resident/fellow's medical decision making as documented in the note with the exception/addition of the followin-year-old male with right groin and leg pain.  Differential includes ilioinguinal/iliohypogastric neuralgia after mesh hernia repair, no active hernia noted on CT scan.  He does have degenerative changes on his lumbar spine which also may be causing lower extremity neuritis and I think is actually much more likely at this point.  Regardless he has not had any real conservative treatment for this will need to start there.  Plan:  - Physical therapy for his lower back.  - Will start duloxetine 30 mg daily for both neuropathic and osteoarthritis pain.  - She is going to follow-up with my nurse practitioner in about 8 weeks after physical therapy and likely will order an MRI of his lumbar spine at that time.    Sky Turner MD        [1]    Current Outpatient Medications:   •  cetirizine (ZyrTEC) 10 mg tablet, Take by mouth once daily., Disp: , Rfl:   •  cholecalciferol (Vitamin D-3) 125 MCG (5000 UT) capsule, Take by mouth once daily., Disp: , Rfl:   •  cholestyramine (Questran) 4 gram packet, Take 1 packet (4 g) by mouth 2 times a day., Disp: 180 packet, Rfl: 1  •  docosahexaenoic acid/epa (FISH OIL ORAL), Take by mouth once daily., Disp: , Rfl:   •  fluticasone (Flonase) 50 mcg/actuation nasal spray, Administer 2 sprays into affected nostril(s) once daily., Disp: , Rfl:   •  L. gasseri-B. bifidum-B longum (Probiotic Colon Care) 1.5 billion cell capsule, Take by mouth once daily., Disp: , Rfl:   •  lisinopril 20 mg tablet, Take 1 tablet (20 mg) by mouth once daily., Disp: 90 tablet, Rfl: 1  •   mesalamine (Delzicol) 400 mg DR capsule, Take 2 capsules (800 mg) by mouth 2 times a day., Disp: 360 capsule, Rfl: 1  •  tamsulosin (Flomax) 0.4 mg 24 hr capsule, Take 2 capsules (0.8 mg) by mouth once daily., Disp: 180 capsule, Rfl: 3  •  DULoxetine (Cymbalta) 30 mg DR capsule, Take 1 capsule (30 mg) by mouth once daily. Do not crush or chew., Disp: 30 capsule, Rfl: 2  •  pantoprazole (ProtoNix) 40 mg EC tablet, TAKE 1 TABLET BY MOUTH ONCE DAILY (Patient taking differently: Take 1 tablet (40 mg) by mouth if needed.), Disp: 30 tablet, Rfl: 0  [2]  Past Medical History:  Diagnosis Date   • Bladder cancer (Multi)    • GERD (gastroesophageal reflux disease)    • Hyperlipidemia    • Hypertension    [3]  Past Surgical History:  Procedure Laterality Date   • BLADDER TUMOR EXCISION     • IR INJECTION JOINT  07/28/2020    IR INJECTION JOINT 7/28/2020 [facility]   • IR INJECTION JOINT  03/25/2021    IR INJECTION JOINT 3/25/2021 [facility]   • IR INJECTION JOINT  08/12/2021    IR INJECTION JOINT 8/12/2021 [facility]   • IR INJECTION JOINT  02/11/2022    IR INJECTION JOINT 2/11/2022 [facility]   • IR INJECTION JOINT  09/08/2022    IR INJECTION JOINT 9/8/2022 [facility]   • IR INJECTION JOINT  03/09/2023    IR INJECTION JOINT POR DIAGRAD   • OTHER SURGICAL HISTORY  08/26/2019    Hernia repair   • OTHER SURGICAL HISTORY  08/26/2019    Cataract surgery   • OTHER SURGICAL HISTORY  08/26/2019    Cholecystectomy   • PARTIAL HIP ARTHROPLASTY Right    [4]  Family History  Problem Relation Name Age of Onset   • Throat cancer Mother     • Hypertension Father     • Other (bladder cancer) Father     • Breast cancer Neg Hx     [5]  Allergies  Allergen Reactions   • Benicar [Olmesartan] Anaphylaxis and GI bleeding   • Omeprazole Nausea Only and Other   • Pepto-Bismol [Bismuth Subsalicylate] Other and Unknown     Reaction from Community: Other(See Desc) Comments: vomiting   • Penicillins Rash and Unknown

## 2025-08-07 ENCOUNTER — TELEPHONE (OUTPATIENT)
Dept: PRIMARY CARE | Facility: CLINIC | Age: 75
End: 2025-08-07
Payer: MEDICARE

## 2025-08-07 DIAGNOSIS — M79.604 PAIN IN BOTH LOWER EXTREMITIES: Primary | ICD-10-CM

## 2025-08-07 DIAGNOSIS — R20.2 NUMBNESS AND TINGLING OF BOTH FEET: ICD-10-CM

## 2025-08-07 DIAGNOSIS — R20.0 NUMBNESS AND TINGLING OF BOTH FEET: ICD-10-CM

## 2025-08-07 DIAGNOSIS — M79.605 PAIN IN BOTH LOWER EXTREMITIES: Primary | ICD-10-CM

## 2025-08-07 NOTE — TELEPHONE ENCOUNTER
Per the patients wife,Dr Barillas will be receiving the office note from [facility] on Jaime.    She states that Jaime's RIGHT foot issue is not an ortho issue.     They were told it was a diabetic issue and his pcp needs to order a special test for the patient.     She could not remember the name of it but she knows it is not blood work.     I placed the office note in Dr Barillas's basket.     Please advise.

## 2025-08-27 ENCOUNTER — EVALUATION (OUTPATIENT)
Dept: PHYSICAL THERAPY | Facility: HOSPITAL | Age: 75
End: 2025-08-27
Payer: MEDICARE

## 2025-08-27 DIAGNOSIS — R29.898 WEAKNESS OF RIGHT LOWER EXTREMITY: Primary | ICD-10-CM

## 2025-08-27 DIAGNOSIS — G89.29 CHRONIC RIGHT-SIDED LOW BACK PAIN WITH RIGHT-SIDED SCIATICA: ICD-10-CM

## 2025-08-27 DIAGNOSIS — M54.41 CHRONIC RIGHT-SIDED LOW BACK PAIN WITH RIGHT-SIDED SCIATICA: ICD-10-CM

## 2025-08-27 DIAGNOSIS — R26.9 GAIT ABNORMALITY: ICD-10-CM

## 2025-08-27 PROCEDURE — 97162 PT EVAL MOD COMPLEX 30 MIN: CPT | Mod: GP

## 2025-08-27 PROCEDURE — 97112 NEUROMUSCULAR REEDUCATION: CPT | Mod: GP

## 2025-08-29 ENCOUNTER — TREATMENT (OUTPATIENT)
Dept: PHYSICAL THERAPY | Facility: HOSPITAL | Age: 75
End: 2025-08-29
Payer: MEDICARE

## 2025-08-29 DIAGNOSIS — G89.29 CHRONIC RIGHT-SIDED LOW BACK PAIN WITH RIGHT-SIDED SCIATICA: ICD-10-CM

## 2025-08-29 DIAGNOSIS — M54.41 CHRONIC RIGHT-SIDED LOW BACK PAIN WITH RIGHT-SIDED SCIATICA: ICD-10-CM

## 2025-08-29 DIAGNOSIS — R29.898 WEAKNESS OF RIGHT LOWER EXTREMITY: ICD-10-CM

## 2025-08-29 PROCEDURE — 97140 MANUAL THERAPY 1/> REGIONS: CPT | Mod: GP,CQ

## 2025-08-29 PROCEDURE — 97110 THERAPEUTIC EXERCISES: CPT | Mod: GP,CQ

## 2025-09-02 ENCOUNTER — HOSPITAL ENCOUNTER (OUTPATIENT)
Dept: NEUROLOGY | Facility: CLINIC | Age: 75
Discharge: HOME | End: 2025-09-02
Payer: MEDICARE

## 2025-09-02 DIAGNOSIS — R20.0 NUMBNESS AND TINGLING OF BOTH FEET: ICD-10-CM

## 2025-09-02 DIAGNOSIS — M79.604 PAIN IN BOTH LOWER EXTREMITIES: ICD-10-CM

## 2025-09-02 DIAGNOSIS — R20.2 NUMBNESS AND TINGLING OF BOTH FEET: ICD-10-CM

## 2025-09-02 DIAGNOSIS — M79.605 PAIN IN BOTH LOWER EXTREMITIES: ICD-10-CM

## 2025-09-02 PROCEDURE — 95908 NRV CNDJ TST 3-4 STUDIES: CPT | Performed by: STUDENT IN AN ORGANIZED HEALTH CARE EDUCATION/TRAINING PROGRAM

## 2025-09-02 PROCEDURE — 95886 MUSC TEST DONE W/N TEST COMP: CPT | Performed by: STUDENT IN AN ORGANIZED HEALTH CARE EDUCATION/TRAINING PROGRAM

## 2025-09-03 ENCOUNTER — TREATMENT (OUTPATIENT)
Dept: PHYSICAL THERAPY | Facility: HOSPITAL | Age: 75
End: 2025-09-03
Payer: MEDICARE

## 2025-09-03 DIAGNOSIS — R29.898 WEAKNESS OF RIGHT LOWER EXTREMITY: ICD-10-CM

## 2025-09-03 DIAGNOSIS — G89.29 CHRONIC RIGHT-SIDED LOW BACK PAIN WITH RIGHT-SIDED SCIATICA: ICD-10-CM

## 2025-09-03 DIAGNOSIS — M54.41 CHRONIC RIGHT-SIDED LOW BACK PAIN WITH RIGHT-SIDED SCIATICA: ICD-10-CM

## 2025-09-03 PROCEDURE — 97110 THERAPEUTIC EXERCISES: CPT | Mod: GP,CQ

## 2025-12-01 ENCOUNTER — APPOINTMENT (OUTPATIENT)
Dept: UROLOGY | Facility: CLINIC | Age: 75
End: 2025-12-01
Payer: MEDICARE

## 2026-01-28 ENCOUNTER — APPOINTMENT (OUTPATIENT)
Dept: PRIMARY CARE | Facility: CLINIC | Age: 76
End: 2026-01-28
Payer: MEDICARE

## (undated) DEVICE — Device

## (undated) DEVICE — SOLUTION, IRRIGATION, USP, STERILE WATER, UROLOGICAL, 3000 ML, BAG

## (undated) DEVICE — IRRIGATION SET, CYSTOSCOPY, REGULATING CLAMP, STRAIGHT, 81 IN

## (undated) DEVICE — TOWEL PACK, STERILE, 4/PACK, BLUE

## (undated) DEVICE — GOWN, SURGICAL, UROLOGY, IMPERVIOUS, XLONG, XLARGE, DISPOSABLE

## (undated) DEVICE — ENDOSCOPE, ASCOPE 4 CYSCO, REVERSE DEFLECTION

## (undated) DEVICE — LUBRICANT, WATER SOLUBLE, BACTERIOSTATIC, 2 OZ, STERILE

## (undated) DEVICE — PREP, SKIN, SWABSTICK, POVIDONE IODINE, TRIPLES

## (undated) DEVICE — GLOVE, PROTEXIS PI CLASSIC, SZ-7.0, PF, LF